# Patient Record
Sex: MALE | Race: WHITE | Employment: OTHER | URBAN - METROPOLITAN AREA
[De-identification: names, ages, dates, MRNs, and addresses within clinical notes are randomized per-mention and may not be internally consistent; named-entity substitution may affect disease eponyms.]

---

## 2017-01-06 ENCOUNTER — OFFICE VISIT (OUTPATIENT)
Dept: INTERNAL MEDICINE CLINIC | Age: 67
End: 2017-01-06

## 2017-01-06 VITALS
OXYGEN SATURATION: 98 % | WEIGHT: 184 LBS | SYSTOLIC BLOOD PRESSURE: 148 MMHG | BODY MASS INDEX: 26.98 KG/M2 | DIASTOLIC BLOOD PRESSURE: 80 MMHG | HEART RATE: 64 BPM

## 2017-01-06 DIAGNOSIS — Z12.5 SPECIAL SCREENING FOR MALIGNANT NEOPLASM OF PROSTATE: ICD-10-CM

## 2017-01-06 DIAGNOSIS — Z95.2 H/O MECHANICAL AORTIC VALVE REPLACEMENT: ICD-10-CM

## 2017-01-06 DIAGNOSIS — E78.5 HYPERLIPIDEMIA, UNSPECIFIED HYPERLIPIDEMIA TYPE: ICD-10-CM

## 2017-01-06 DIAGNOSIS — E11.9 TYPE 2 DIABETES MELLITUS WITHOUT COMPLICATION, WITHOUT LONG-TERM CURRENT USE OF INSULIN (HCC): ICD-10-CM

## 2017-01-06 DIAGNOSIS — I25.10 CORONARY ARTERY DISEASE INVOLVING NATIVE CORONARY ARTERY OF NATIVE HEART WITHOUT ANGINA PECTORIS: Primary | ICD-10-CM

## 2017-01-06 DIAGNOSIS — K63.5 COLON POLYPS: ICD-10-CM

## 2017-01-06 LAB
CREATININE URINE POCT: ABNORMAL
MICROALBUMIN/CREAT 24H UR: 20 MG/G{CREAT}

## 2017-01-06 PROCEDURE — 3288F FALL RISK ASSESSMENT DOCD: CPT | Performed by: INTERNAL MEDICINE

## 2017-01-06 PROCEDURE — G8510 SCR DEP NEG, NO PLAN REQD: HCPCS | Performed by: INTERNAL MEDICINE

## 2017-01-06 PROCEDURE — 82044 UR ALBUMIN SEMIQUANTITATIVE: CPT | Performed by: INTERNAL MEDICINE

## 2017-01-06 PROCEDURE — 99214 OFFICE O/P EST MOD 30 MIN: CPT | Performed by: INTERNAL MEDICINE

## 2017-01-06 RX ORDER — GLUCOSAMINE SULFATE 500 MG
CAPSULE ORAL
COMMUNITY
End: 2019-07-03

## 2017-01-06 ASSESSMENT — PATIENT HEALTH QUESTIONNAIRE - PHQ9
SUM OF ALL RESPONSES TO PHQ QUESTIONS 1-9: 0
2. FEELING DOWN, DEPRESSED OR HOPELESS: 0
SUM OF ALL RESPONSES TO PHQ9 QUESTIONS 1 & 2: 0
1. LITTLE INTEREST OR PLEASURE IN DOING THINGS: 0

## 2017-01-31 DIAGNOSIS — E11.9 TYPE 2 DIABETES MELLITUS WITHOUT COMPLICATION (HCC): ICD-10-CM

## 2017-01-31 RX ORDER — GLIMEPIRIDE 1 MG/1
TABLET ORAL
Qty: 90 TABLET | Refills: 3 | Status: SHIPPED | OUTPATIENT
Start: 2017-01-31 | End: 2018-01-16 | Stop reason: SDUPTHER

## 2017-02-20 DIAGNOSIS — E11.9 TYPE 2 DIABETES MELLITUS WITHOUT COMPLICATION (HCC): ICD-10-CM

## 2017-02-20 RX ORDER — LISINOPRIL 5 MG/1
TABLET ORAL
Qty: 90 TABLET | Refills: 3 | Status: SHIPPED | OUTPATIENT
Start: 2017-02-20 | End: 2017-07-13 | Stop reason: SDUPTHER

## 2017-03-07 RX ORDER — PRAVASTATIN SODIUM 80 MG/1
TABLET ORAL
Qty: 90 TABLET | Refills: 3 | Status: SHIPPED | OUTPATIENT
Start: 2017-03-07 | End: 2018-01-16 | Stop reason: SDUPTHER

## 2017-03-07 RX ORDER — FENOFIBRATE 160 MG/1
TABLET ORAL
Qty: 90 TABLET | Refills: 3 | Status: SHIPPED | OUTPATIENT
Start: 2017-03-07 | End: 2018-01-16 | Stop reason: SDUPTHER

## 2017-03-28 RX ORDER — BLOOD SUGAR DIAGNOSTIC
STRIP MISCELLANEOUS
Qty: 100 STRIP | Refills: 12 | Status: SHIPPED | OUTPATIENT
Start: 2017-03-28 | End: 2018-04-22 | Stop reason: SDUPTHER

## 2017-05-18 ENCOUNTER — HOSPITAL ENCOUNTER (OUTPATIENT)
Dept: SURGERY | Age: 67
Discharge: HOME | End: 2017-05-18
Attending: INTERNAL MEDICINE | Admitting: INTERNAL MEDICINE

## 2017-05-18 VITALS
RESPIRATION RATE: 18 BRPM | HEART RATE: 54 BPM | DIASTOLIC BLOOD PRESSURE: 72 MMHG | SYSTOLIC BLOOD PRESSURE: 122 MMHG | TEMPERATURE: 97.8 F | WEIGHT: 176 LBS | HEIGHT: 69 IN | BODY MASS INDEX: 26.07 KG/M2 | OXYGEN SATURATION: 94 %

## 2017-05-18 DIAGNOSIS — Z86.010 HISTORY OF COLONIC POLYPS: ICD-10-CM

## 2017-05-18 LAB
GLUCOSE BLD-MCNC: 122 MG/DL (ref 70–99)
GLUCOSE BLD-MCNC: 142 MG/DL (ref 70–99)
PERFORMED ON: ABNORMAL
PERFORMED ON: ABNORMAL

## 2017-05-18 RX ORDER — SODIUM CHLORIDE, SODIUM LACTATE, POTASSIUM CHLORIDE, CALCIUM CHLORIDE 600; 310; 30; 20 MG/100ML; MG/100ML; MG/100ML; MG/100ML
INJECTION, SOLUTION INTRAVENOUS CONTINUOUS
Status: DISCONTINUED | OUTPATIENT
Start: 2017-05-18 | End: 2017-05-19 | Stop reason: HOSPADM

## 2017-05-18 RX ORDER — LIDOCAINE HYDROCHLORIDE 10 MG/ML
0.1 INJECTION, SOLUTION INFILTRATION; PERINEURAL ONCE
Status: DISCONTINUED | OUTPATIENT
Start: 2017-05-18 | End: 2017-05-19 | Stop reason: HOSPADM

## 2017-05-18 RX ADMIN — SODIUM CHLORIDE, SODIUM LACTATE, POTASSIUM CHLORIDE, CALCIUM CHLORIDE: 600; 310; 30; 20 INJECTION, SOLUTION INTRAVENOUS at 07:01

## 2017-05-18 ASSESSMENT — PAIN SCALES - GENERAL
PAINLEVEL_OUTOF10: 0
PAINLEVEL_OUTOF10: 0

## 2017-05-18 ASSESSMENT — PAIN - FUNCTIONAL ASSESSMENT: PAIN_FUNCTIONAL_ASSESSMENT: 0-10

## 2017-07-13 ENCOUNTER — OFFICE VISIT (OUTPATIENT)
Dept: INTERNAL MEDICINE CLINIC | Age: 67
End: 2017-07-13

## 2017-07-13 VITALS
OXYGEN SATURATION: 98 % | DIASTOLIC BLOOD PRESSURE: 70 MMHG | HEART RATE: 62 BPM | HEIGHT: 69 IN | SYSTOLIC BLOOD PRESSURE: 144 MMHG | WEIGHT: 181 LBS | BODY MASS INDEX: 26.81 KG/M2

## 2017-07-13 DIAGNOSIS — E78.5 HYPERLIPIDEMIA, UNSPECIFIED HYPERLIPIDEMIA TYPE: Primary | ICD-10-CM

## 2017-07-13 DIAGNOSIS — I25.10 CORONARY ARTERY DISEASE INVOLVING NATIVE CORONARY ARTERY OF NATIVE HEART WITHOUT ANGINA PECTORIS: ICD-10-CM

## 2017-07-13 DIAGNOSIS — E11.9 TYPE 2 DIABETES MELLITUS WITHOUT COMPLICATION, WITHOUT LONG-TERM CURRENT USE OF INSULIN (HCC): ICD-10-CM

## 2017-07-13 PROCEDURE — 99214 OFFICE O/P EST MOD 30 MIN: CPT | Performed by: INTERNAL MEDICINE

## 2017-07-13 RX ORDER — LISINOPRIL 10 MG/1
TABLET ORAL
Qty: 90 TABLET | Refills: 3 | Status: SHIPPED | OUTPATIENT
Start: 2017-07-13 | End: 2018-01-16 | Stop reason: SDUPTHER

## 2017-09-14 DIAGNOSIS — E11.9 TYPE 2 DIABETES MELLITUS WITHOUT COMPLICATION, WITHOUT LONG-TERM CURRENT USE OF INSULIN (HCC): Primary | ICD-10-CM

## 2017-09-14 RX ORDER — LANCETS 30 GAUGE
1 EACH MISCELLANEOUS DAILY
Qty: 100 EACH | Refills: 3 | Status: SHIPPED | OUTPATIENT
Start: 2017-09-14 | End: 2019-09-09 | Stop reason: SDUPTHER

## 2018-01-16 ENCOUNTER — OFFICE VISIT (OUTPATIENT)
Dept: INTERNAL MEDICINE CLINIC | Age: 68
End: 2018-01-16

## 2018-01-16 VITALS
DIASTOLIC BLOOD PRESSURE: 58 MMHG | BODY MASS INDEX: 26.66 KG/M2 | HEART RATE: 70 BPM | OXYGEN SATURATION: 98 % | WEIGHT: 180 LBS | HEIGHT: 69 IN | SYSTOLIC BLOOD PRESSURE: 130 MMHG

## 2018-01-16 DIAGNOSIS — E11.9 TYPE 2 DIABETES MELLITUS WITHOUT COMPLICATION, WITHOUT LONG-TERM CURRENT USE OF INSULIN (HCC): Primary | ICD-10-CM

## 2018-01-16 DIAGNOSIS — N18.30 STAGE 3 CHRONIC KIDNEY DISEASE (HCC): ICD-10-CM

## 2018-01-16 DIAGNOSIS — I10 ESSENTIAL HYPERTENSION: ICD-10-CM

## 2018-01-16 DIAGNOSIS — I25.10 CORONARY ARTERY DISEASE INVOLVING NATIVE CORONARY ARTERY OF NATIVE HEART WITHOUT ANGINA PECTORIS: ICD-10-CM

## 2018-01-16 DIAGNOSIS — G89.29 CHRONIC LEFT SHOULDER PAIN: ICD-10-CM

## 2018-01-16 DIAGNOSIS — M25.512 CHRONIC LEFT SHOULDER PAIN: ICD-10-CM

## 2018-01-16 DIAGNOSIS — Z12.5 SPECIAL SCREENING FOR MALIGNANT NEOPLASM OF PROSTATE: ICD-10-CM

## 2018-01-16 DIAGNOSIS — E78.5 HYPERLIPIDEMIA, UNSPECIFIED HYPERLIPIDEMIA TYPE: ICD-10-CM

## 2018-01-16 LAB
CREATININE URINE POCT: NORMAL
MICROALBUMIN/CREAT 24H UR: 20 MG/G{CREAT}
MICROALBUMIN/CREAT UR-RTO: NORMAL

## 2018-01-16 PROCEDURE — 4040F PNEUMOC VAC/ADMIN/RCVD: CPT | Performed by: INTERNAL MEDICINE

## 2018-01-16 PROCEDURE — 99214 OFFICE O/P EST MOD 30 MIN: CPT | Performed by: INTERNAL MEDICINE

## 2018-01-16 PROCEDURE — 82044 UR ALBUMIN SEMIQUANTITATIVE: CPT | Performed by: INTERNAL MEDICINE

## 2018-01-16 PROCEDURE — G8598 ASA/ANTIPLAT THER USED: HCPCS | Performed by: INTERNAL MEDICINE

## 2018-01-16 PROCEDURE — 3045F PR MOST RECENT HEMOGLOBIN A1C LEVEL 7.0-9.0%: CPT | Performed by: INTERNAL MEDICINE

## 2018-01-16 PROCEDURE — G8427 DOCREV CUR MEDS BY ELIG CLIN: HCPCS | Performed by: INTERNAL MEDICINE

## 2018-01-16 PROCEDURE — 1123F ACP DISCUSS/DSCN MKR DOCD: CPT | Performed by: INTERNAL MEDICINE

## 2018-01-16 PROCEDURE — 3017F COLORECTAL CA SCREEN DOC REV: CPT | Performed by: INTERNAL MEDICINE

## 2018-01-16 PROCEDURE — G8419 CALC BMI OUT NRM PARAM NOF/U: HCPCS | Performed by: INTERNAL MEDICINE

## 2018-01-16 PROCEDURE — 1036F TOBACCO NON-USER: CPT | Performed by: INTERNAL MEDICINE

## 2018-01-16 PROCEDURE — G8484 FLU IMMUNIZE NO ADMIN: HCPCS | Performed by: INTERNAL MEDICINE

## 2018-01-16 RX ORDER — DIPHENOXYLATE HYDROCHLORIDE AND ATROPINE SULFATE 2.5; .025 MG/1; MG/1
1 TABLET ORAL 4 TIMES DAILY PRN
Qty: 15 TABLET | Refills: 1 | Status: SHIPPED | OUTPATIENT
Start: 2018-01-16 | End: 2019-05-31 | Stop reason: SDUPTHER

## 2018-01-16 RX ORDER — LISINOPRIL 10 MG/1
TABLET ORAL
Qty: 90 TABLET | Refills: 3 | Status: SHIPPED | OUTPATIENT
Start: 2018-01-16 | End: 2019-03-07 | Stop reason: SDUPTHER

## 2018-01-16 RX ORDER — PRAVASTATIN SODIUM 80 MG/1
TABLET ORAL
Qty: 90 TABLET | Refills: 3 | Status: SHIPPED | OUTPATIENT
Start: 2018-01-16 | End: 2019-03-07 | Stop reason: SDUPTHER

## 2018-01-16 RX ORDER — GLIMEPIRIDE 1 MG/1
TABLET ORAL
Qty: 90 TABLET | Refills: 3 | Status: SHIPPED | OUTPATIENT
Start: 2018-01-16 | End: 2018-12-29 | Stop reason: SDUPTHER

## 2018-01-16 RX ORDER — FENOFIBRATE 160 MG/1
TABLET ORAL
Qty: 90 TABLET | Refills: 3 | Status: SHIPPED | OUTPATIENT
Start: 2018-01-16 | End: 2019-03-23 | Stop reason: SDUPTHER

## 2018-01-16 ASSESSMENT — PATIENT HEALTH QUESTIONNAIRE - PHQ9
SUM OF ALL RESPONSES TO PHQ9 QUESTIONS 1 & 2: 0
2. FEELING DOWN, DEPRESSED OR HOPELESS: 0
SUM OF ALL RESPONSES TO PHQ QUESTIONS 1-9: 0
1. LITTLE INTEREST OR PLEASURE IN DOING THINGS: 0

## 2018-01-22 ENCOUNTER — OFFICE VISIT (OUTPATIENT)
Dept: ORTHOPEDIC SURGERY | Age: 68
End: 2018-01-22

## 2018-01-22 VITALS
DIASTOLIC BLOOD PRESSURE: 64 MMHG | HEIGHT: 69 IN | HEART RATE: 67 BPM | BODY MASS INDEX: 26.25 KG/M2 | SYSTOLIC BLOOD PRESSURE: 133 MMHG | WEIGHT: 177.2 LBS

## 2018-01-22 DIAGNOSIS — M67.912 TENDINOPATHY OF LEFT ROTATOR CUFF: ICD-10-CM

## 2018-01-22 DIAGNOSIS — M25.512 LEFT SHOULDER PAIN, UNSPECIFIED CHRONICITY: Primary | ICD-10-CM

## 2018-01-22 DIAGNOSIS — M79.602 LEFT ARM PAIN: ICD-10-CM

## 2018-01-22 DIAGNOSIS — M75.42 SHOULDER IMPINGEMENT, LEFT: ICD-10-CM

## 2018-01-22 DIAGNOSIS — M77.8 CAPSULITIS OF LEFT SHOULDER: ICD-10-CM

## 2018-01-22 PROBLEM — M25.812 SHOULDER IMPINGEMENT, LEFT: Status: ACTIVE | Noted: 2018-01-22

## 2018-01-22 PROCEDURE — G8427 DOCREV CUR MEDS BY ELIG CLIN: HCPCS | Performed by: FAMILY MEDICINE

## 2018-01-22 PROCEDURE — 20610 DRAIN/INJ JOINT/BURSA W/O US: CPT | Performed by: FAMILY MEDICINE

## 2018-01-22 PROCEDURE — 4040F PNEUMOC VAC/ADMIN/RCVD: CPT | Performed by: FAMILY MEDICINE

## 2018-01-22 PROCEDURE — G8484 FLU IMMUNIZE NO ADMIN: HCPCS | Performed by: FAMILY MEDICINE

## 2018-01-22 PROCEDURE — G8419 CALC BMI OUT NRM PARAM NOF/U: HCPCS | Performed by: FAMILY MEDICINE

## 2018-01-22 PROCEDURE — 99214 OFFICE O/P EST MOD 30 MIN: CPT | Performed by: FAMILY MEDICINE

## 2018-01-22 PROCEDURE — 3017F COLORECTAL CA SCREEN DOC REV: CPT | Performed by: FAMILY MEDICINE

## 2018-01-23 NOTE — PROGRESS NOTES
Chief Complaint    Shoulder Pain (Left shoulder pain)    Initial consultation left shoulder and arm pain with motion loss    History of Present Illness:  Ambrosio Huggins is a 69 y.o. male who is a very pleasant white male retired  who has history of reasonably well controlled type II diabetes and is on Coumadin as he is status post CABG remotely who is a very nice patient of Dr. Stormy Gonzalez who is being seen today in time consultation from Dr. Stormy Gonzalez for evaluation of ongoing left arm much lesser degree shoulder pain. He states that since November 2017 he noticed the gradual onset of achiness primarily to the mid and lower portion of his upper arm with soreness and stiffness and motion loss involving his left shoulder. There is no actual history of injury or new activity but believes his current symptoms may be related to him climbing up and down from history stands while hunting last fall. There is no actual trauma. He deal of actual shoulder pain but with pain reproduced to the upper arm with extreme internal rotation. He has some limitations of abduction and overhead activity as well. He reports no locking or catching. He describes his arm pain is more achy in nature at 2-3+ but can be quite sharp with internal rotation at 78 out of 10. Reports no substantial neck pain and radicular symptoms numbness or tingling. He has not had a dedicated treatment to date but did see Dr. gonzalez in the office last week who recommended today's orthopedic consultation. Previous history of shoulder injury. Medical History  Patient's medications, allergies, past medical, surgical, social and family histories were reviewed and updated as appropriate. Review of Systems  Relevant review of systems reviewed on 1/21/2018 and available in the patient's chart under the medial tab.        Vital Signs  Vitals:    01/22/18 0923   BP: 133/64   Pulse: 67         General Exam:   Constitutional: Patient is

## 2018-01-26 ENCOUNTER — HOSPITAL ENCOUNTER (OUTPATIENT)
Dept: PHYSICAL THERAPY | Age: 68
Discharge: HOME | End: 2018-01-31
Admitting: FAMILY MEDICINE

## 2018-01-26 NOTE — FLOWSHEET NOTE
Reviewed/Progressed HEP activities related to improving balance, coordination, kinesthetic sense, posture, motor skill, proprioception of scapular, scapulothoracic and UE control with self care, reaching, carrying, lifting, house/yardwork, driving/computer work      Manual Treatments:  PROM / STM / Oscillations-Mobs:  G-I, II, III, IV (PA's, Inf., Post.)  [x] (66413) Provided manual therapy to mobilize soft tissue/joints of cervical/CT, scapular GHJ and UE for the purpose of modulating pain, promoting relaxation,  increasing ROM, reducing/eliminating soft tissue swelling/inflammation/restriction, improving soft tissue extensibility and allowing for proper ROM for normal function with self care, reaching, carrying, lifting, house/yardwork, driving/computer work    Modalities:  15 min PM ESU with CP    Charges:  Timed Code Treatment Minutes: 30   Total Treatment Minutes: 60     [x] EVAL (LOW) 16786 (typically 20 minutes face-to-face)  [] EVAL (MOD) 98712 (typically 30 minutes face-to-face)  [] EVAL (HIGH) 42694 (typically 45 minutes face-to-face)  [] RE-EVAL     [x] FF(20037) x  1   [] IONTO  [] NMR (67915) x      [] VASO  [x] Manual (63927) x  1    [] Other:  [] TA x       [] Mech Traction (99849)  [] ES(attended) (03937)      [x] ES (un) (61576):     GOALS:  Patient stated goal: Pt would like to regain painfree ROM    Therapist goals for Patient:   Short Term Goals: To be achieved in: 2 weeks  1. Independent in HEP and progression per patient tolerance, in order to prevent re-injury. 2. Patient will have a decrease in pain to facilitate improvement in movement, function, and ADLs as indicated by Functional Deficits. Long Term Goals: To be achieved in: 8 weeks  1. Disability index score of 10% or less for the [facility] to assist with reaching prior level of function.    2. Patient will demonstrate increased AROM to IR equal bilaterally to allow for proper joint functioning as indicated by patients Functional

## 2018-01-26 NOTE — PLAN OF CARE
window for 20 seconds before he could bring it back in. Difficulty with tucking in shirt and putting on belt, puts L arm in jackets first to prevent pain. Had a shot and was prescribed some cream which did not help. Relevant Medical History:artificial valve  Functional Disability Index:PT G-Codes  Functional Assessment Tool Used: Quick DASH 11  Score: 16%  Functional Limitation: Carrying, moving and handling objects  Carrying, Moving and Handling Objects Current Status (): At least 1 percent but less than 20 percent impaired, limited or restricted  Carrying, Moving and Handling Objects Goal Status ():  At least 1 percent but less than 20 percent impaired, limited or restricted    Pain Scale: 9 (when he does provocative activities) currently 0 /10  Easing factors: avoiding rotational movments  Provocative factors: rotational movements     Type: []Constant   [x]Intermittent  []Radiating []Localized []other:     Numbness/Tingling: none    Occupation/School: retired    Living Status/Prior Level of Function: Independent with ADLs and IADLs, hunting and fly fishing    OBJECTIVE:     CERV ROM     Cervical Flexion WNL    Cervical Extension WNL    Cervical SB WNL    Cervical rotation WNL         ROM Left Right   Shoulder Flex WNL WNL   Shoulder Abd WNL WNL   Shoulder ER C7 T1   Shoulder IR L5 T8                  Strength  Left Right   Shoulder Flex WNL    Shoulder Scap WNL    Shoulder ER 4/5 some pain    Shoulder IR 3+/5 some pain                Reflexes/Sensation:    []Dermatomes/Myotomes intact    []Reflexes equal and normal bilaterally   []Other:  2 levels lower behind head   Joint mobility:   []Normal    [x]Hypo   []Hyper    Palpation: No TTP reported by patient, increased tissue tension in L UT    Functional Mobility/Transfers: No difficulty with transfers or mobility    Posture: rounded shoulders, forward head    Bandages/Dressings/Incisions: n/a    Gait: (include devices/WB status): WNL    Orthopedic Special Tests: negative empty can, negative speeds, negative mcmurrays, negative yergasons                        [x] Patient history, allergies, meds reviewed. Medical chart reviewed. See intake form. Review Of Systems (ROS):  [x]Performed Review of systems (Integumentary, CardioPulmonary, Neurological) by intake and observation. Intake form has been scanned into medical record. Patient has been instructed to contact their primary care physician regarding ROS issues if not already being addressed at this time. Co-morbidities/Complexities (which will affect course of rehabilitation):   []None           Arthritic conditions   []Rheumatoid arthritis (M05.9)  [x]Osteoarthritis (M19.91)   Cardiovascular conditions   [x]Hypertension (I10)  []Hyperlipidemia (E78.5)  []Angina pectoris (I20)  []Atherosclerosis (I70)   Musculoskeletal conditions   []Disc pathology   []Congenital spine pathologies   []Prior surgical intervention  []Osteoporosis (M81.8)  []Osteopenia (M85.8)   Endocrine conditions   []Hypothyroid (E03.9)  []Hyperthyroid Gastrointestinal conditions   []Constipation (E81.29)   Metabolic conditions   []Morbid obesity (E66.01)  [x]Diabetes type 1(E10.65) or 2 (E11.65)   []Neuropathy (G60.9)     Pulmonary conditions   []Asthma (J45)  []Coughing   []COPD (J44.9)   Psychological Disorders  []Anxiety (F41.9)  []Depression (F32.9)   []Other:   [x]Other:     Artifical valve     Barriers to/and or personal factors that will affect rehab potential:              []Age  []Sex              []Motivation/Lack of Motivation                        [x]Co-Morbidities              [x]Cognitive Function, education/learning barriers              []Environmental, home barriers              []profession/work barriers  []past PT/medical experience  []other:    Justification:    Falls Risk Assessment (30 days):   [x] Falls Risk assessed and no intervention required.   [] Falls Risk assessed and Patient requires intervention due to being higher risk   TUG score (>12s at risk):     [] Falls education provided, including       G-Codes:  PT G-Codes  Functional Assessment Tool Used: Quick DASH 11  Score: 16%  Functional Limitation: Carrying, moving and handling objects  Carrying, Moving and Handling Objects Current Status (): At least 1 percent but less than 20 percent impaired, limited or restricted  Carrying, Moving and Handling Objects Goal Status (): At least 1 percent but less than 20 percent impaired, limited or restricted    ASSESSMENT:   Functional Impairments   [x]Noted spinal or UE joint hypomobility   []Noted spinal or UE joint hypermobility   [x]Decreased UE functional ROM   [x]Decreased UE functional strength   [x]Abnormal reflexes/sensation/myotomal/dermatomal deficits   [x]Decreased RC/scapular/core strength and neuromuscular control   []other:      Functional Activity Limitations (from functional questionnaire and intake)   []Reduced ability to tolerate prolonged functional positions   []Reduced ability or difficulty with changes of positions or transfers between positions   [x]Reduced ability to maintain good posture and demonstrate good body mechanics with sitting, bending, and lifting   [x] Reduced ability or tolerance with driving and/or computer work   []Reduced ability to sleep   [x]Reduced ability to perform lifting, reaching, carrying tasks   []Reduced ability to tolerate impact through UE   [x]Reduced ability to reach behind back   []Reduced ability to  or hold objects   [x]Reduced ability to throw or toss an object   []other:    Participation Restrictions   [x]Reduced participation in self care activities   [x]Reduced participation in home management activities   []Reduced participation in work activities   [x]Reduced participation in social activities. [x]Reduced participation in sport/recreation activities.     Classification:   []Signs/symptoms consistent with post-surgical status including decreased ROM, strength and function. [x]Signs/symptoms consistent with joint sprain/strain   []Signs/symptoms consistent with shoulder impingement   []Signs/symptoms consistent with shoulder/elbow/wrist tendinopathy   []Signs/symptoms consistent with Rotator cuff tear   []Signs/symptoms consistent with labral tear   []Signs/symptoms consistent with postural dysfunction    [x]Signs/symptoms consistent with Glenohumeral IR Deficit - <45 degrees   []Signs/symptoms consistent with facet dysfunction of cervical/thoracic spine    []Signs/symptoms consistent with pathology which may benefit from Dry needling     []other:     Prognosis/Rehab Potential:      []Excellent   [x]Good    []Fair   []Poor    Tolerance of evaluation/treatment:    []Excellent   [x]Good    []Fair   []Poor  Physical Therapy Evaluation Complexity Justification  [x] A history of present problem with:  [] no personal factors and/or comorbidities that impact the plan of care;  [x]1-2 personal factors and/or comorbidities that impact the plan of care  []3 personal factors and/or comorbidities that impact the plan of care  [x] An examination of body systems using standardized tests and measures addressing any of the following: body structures and functions (impairments), activity limitations, and/or participation restrictions;:  [x] a total of 1-2 or more elements   [] a total of 3 or more elements   [] a total of 4 or more elements   [x] A clinical presentation with:  [x] stable and/or uncomplicated characteristics   [] evolving clinical presentation with changing characteristics  [] unstable and unpredictable characteristics;   [x] Clinical decision making of [x] low, [] moderate, [] high complexity using standardized patient assessment instrument and/or measurable assessment of functional outcome.     [x] EVAL (LOW) 90366 (typically 20 minutes face-to-face)  [] EVAL (MOD) 61812 (typically 30 minutes face-to-face)  [] EVAL (HIGH) 15745 (typically 45 minutes face-to-face)  [] RE-EVAL       PLAN:  Frequency/Duration:  1-2 days per week for 8 Weeks:  INTERVENTIONS:  [x] Therapeutic exercise including: strength training, ROM, for Upper extremity and core   [x]  NMR activation and proprioception for UE, scap and Core   [x] Manual therapy as indicated for shoulder, scapula and spine to include: Dry Needling/IASTM, STM, PROM, Gr I-IV mobilizations, manipulation. [x] Modalities as needed that may include: thermal agents, E-stim, Biofeedback, US, iontophoresis as indicated  [x] Patient education on joint protection, postural re-education, activity modification, progression of HEP. HEP instruction: (see scanned forms)    GOALS:  Patient stated goal: Pt would like to regain painfree ROM    Therapist goals for Patient:   Short Term Goals: To be achieved in: 2 weeks  1. Independent in HEP and progression per patient tolerance, in order to prevent re-injury. 2. Patient will have a decrease in pain to facilitate improvement in movement, function, and ADLs as indicated by Functional Deficits. Long Term Goals: To be achieved in: 8 weeks  1. Disability index score of 10% or less for the [facility] to assist with reaching prior level of function. 2. Patient will demonstrate increased AROM to IR equal bilaterally to allow for proper joint functioning as indicated by patients Functional Deficits. 3. Patient will demonstrate an increase in Strength to 4=/5 IR to allow for proper functional mobility as indicated by patients Functional Deficits. 4. Patient will return to be able to tuck shirt in/put belt on without increased symptoms or restriction.           Electronically signed by:  Krista Silva PT

## 2018-01-30 ENCOUNTER — HOSPITAL ENCOUNTER (OUTPATIENT)
Dept: PHYSICAL THERAPY | Age: 68
Discharge: HOME | End: 2018-01-30
Admitting: FAMILY MEDICINE

## 2018-02-01 ENCOUNTER — HOSPITAL ENCOUNTER (OUTPATIENT)
Dept: PHYSICAL THERAPY | Age: 68
Discharge: HOME | End: 2018-02-28
Attending: FAMILY MEDICINE | Admitting: FAMILY MEDICINE

## 2018-02-02 ENCOUNTER — HOSPITAL ENCOUNTER (OUTPATIENT)
Dept: PHYSICAL THERAPY | Age: 68
Discharge: HOME | End: 2018-02-03
Admitting: FAMILY MEDICINE

## 2018-02-02 NOTE — FLOWSHEET NOTE
Reviewed/Progressed HEP activities related to improving balance, coordination, kinesthetic sense, posture, motor skill, proprioception of scapular, scapulothoracic and UE control with self care, reaching, carrying, lifting, house/yardwork, driving/computer work      Manual Treatments:  PROM / STM / Oscillations-Mobs:  G-I, II, III, IV (PA's, Inf., Post.)  [x] (25222) Provided manual therapy to mobilize soft tissue/joints of cervical/CT, scapular GHJ and UE for the purpose of modulating pain, promoting relaxation,  increasing ROM, reducing/eliminating soft tissue swelling/inflammation/restriction, improving soft tissue extensibility and allowing for proper ROM for normal function with self care, reaching, carrying, lifting, house/yardwork, driving/computer work    Modalities:  15 min PM ESU with CP    Charges:  Timed Code Treatment Minutes: 40   Total Treatment Minutes: 60     [] EVAL (LOW) 41167 (typically 20 minutes face-to-face)  [] EVAL (MOD) 90108 (typically 30 minutes face-to-face)  [] EVAL (HIGH) 77988 (typically 45 minutes face-to-face)  [] RE-EVAL     [x] OE(66310) x  2   [] IONTO  [] NMR (08619) x      [] VASO  [x] Manual (53971) x  1    [] Other:  [] TA x       [] Mech Traction (02243)  [] ES(attended) (39955)      [x] ES (un) (95303):     GOALS:  Patient stated goal: Pt would like to regain painfree ROM    Therapist goals for Patient:   Short Term Goals: To be achieved in: 2 weeks  1. Independent in HEP and progression per patient tolerance, in order to prevent re-injury. 2. Patient will have a decrease in pain to facilitate improvement in movement, function, and ADLs as indicated by Functional Deficits. Long Term Goals: To be achieved in: 8 weeks  1. Disability index score of 10% or less for the [facility] to assist with reaching prior level of function.    2. Patient will demonstrate increased AROM to IR equal bilaterally to allow for proper joint functioning as indicated by patients Functional

## 2018-02-06 ENCOUNTER — HOSPITAL ENCOUNTER (OUTPATIENT)
Dept: PHYSICAL THERAPY | Age: 68
Discharge: HOME | End: 2018-02-07
Admitting: FAMILY MEDICINE

## 2018-02-06 NOTE — FLOWSHEET NOTE
this week to continue working. OBJECTIVE:   Observation: Improved scapular mechanics, increased IR with decreased symptoms  Palpation:     Test used Initial score Current Score   Pain Summary VAS 9    Functional questionnaire QDash 16%    ROM flexion WNL     ER C7     ABD      IR L5 L3   Strength flexion WNL     ER 4/5 pain     ABD      IR 3+/5 pain       RESTRICTIONS/PRECAUTIONS:     Exercises/Interventions:   Therapeutic Ex Sets/reps Notes   Triceps stretch 3 x 30\" 2 x 10   Behind back IR nerve glide  2 x 10   Supine ER behind head stretch 3 x 30\" 2 x 10   SL ER  SBS  No $ 2 x 10  1 x 10  2 x 10 HEP  HEP  HEP YTB   Supine Sa, R.S. Up/down, s/s 2 x 10 2#   Prone row 2 x 10 2#   TB Row/ext 2 x 10 HEP RTB   TB ER 20x RTB   Ball on wall RS 3 x 30\"                                                                Manual Intervention     GH mobs grade 1-2 PA/inf 4 min    Shoulder PROM 6 min    STM and CPA grades 1-2 cervical spine 5 min                        NMR re-education                                                 Therapeutic Exercise and NMR EXR  [x] (18130) Provided verbal/tactile cueing for activities related to strengthening, flexibility, endurance, ROM  for improvements in scapular, scapulothoracic and UE control with self care, reaching, carrying, lifting, house/yardwork, driving/computer work.    [] (50148) Provided verbal/tactile cueing for activities related to improving balance, coordination, kinesthetic sense, posture, motor skill, proprioception  to assist with  scapular, scapulothoracic and UE control with self care, reaching, carrying, lifting, house/yardwork, driving/computer work.     Therapeutic Activities:    [] (23117 or 66836) Provided verbal/tactile cueing for activities related to improving balance, coordination, kinesthetic sense, posture, motor skill, proprioception and motor activation to allow for proper function of scapular, scapulothoracic and UE control with self care, carrying,

## 2018-02-09 ENCOUNTER — HOSPITAL ENCOUNTER (OUTPATIENT)
Dept: PHYSICAL THERAPY | Age: 68
Discharge: HOME | End: 2018-02-10
Admitting: FAMILY MEDICINE

## 2018-02-09 NOTE — FLOWSHEET NOTE
RESTRICTIONS/PRECAUTIONS:     Exercises/Interventions:   Therapeutic Ex Sets/reps Notes   Triceps stretch 3 x 30\" 2 x 10   Behind back IR nerve glide  2 x 10   Supine ER behind head stretch 3 x 30\" 2 x 10   SL ER  SBS  No $ 2 x 10  1 x 10  2 x 10 1# HEP  HEP  HEP RTB   Supine Sa, R.S. Up/down, s/s 2 x 10 2#   Prone row 2 x 10 2#   TB Row/ext 2 x 10 HEP RTB   TB ER/IR 20x RTB   Sleeper stretch 3 x 30\"                                                                Manual Intervention     GH mobs grade 1-2 PA/inf 4 min    Shoulder PROM 6 min    STM and CPA grades 1-2 cervical spine     STM posterior scapula and scap stabilizers 5 min                   NMR re-education                                                 Therapeutic Exercise and NMR EXR  [x] (55780) Provided verbal/tactile cueing for activities related to strengthening, flexibility, endurance, ROM  for improvements in scapular, scapulothoracic and UE control with self care, reaching, carrying, lifting, house/yardwork, driving/computer work.    [] (99143) Provided verbal/tactile cueing for activities related to improving balance, coordination, kinesthetic sense, posture, motor skill, proprioception  to assist with  scapular, scapulothoracic and UE control with self care, reaching, carrying, lifting, house/yardwork, driving/computer work. Therapeutic Activities:    [] (45150 or 11797) Provided verbal/tactile cueing for activities related to improving balance, coordination, kinesthetic sense, posture, motor skill, proprioception and motor activation to allow for proper function of scapular, scapulothoracic and UE control with self care, carrying, lifting, driving/computer work.      Home Exercise Program:    [x] (60482) Reviewed/Progressed HEP activities related to strengthening, flexibility, endurance, ROM of scapular, scapulothoracic and UE control with self care, reaching, carrying, lifting, house/yardwork, driving/computer work  [] (04569)

## 2018-02-13 ENCOUNTER — HOSPITAL ENCOUNTER (OUTPATIENT)
Dept: PHYSICAL THERAPY | Age: 68
Discharge: HOME | End: 2018-02-14
Admitting: FAMILY MEDICINE

## 2018-02-16 ENCOUNTER — HOSPITAL ENCOUNTER (OUTPATIENT)
Dept: PHYSICAL THERAPY | Age: 68
Discharge: HOME | End: 2018-02-17
Admitting: FAMILY MEDICINE

## 2018-02-16 NOTE — FLOWSHEET NOTE
[facility], [address]  Phone: [phone]  Fax [phone]    Physical Therapy Daily Treatment Note  Date:  2018    Patient Name:  Silvano Quiroga    :  1950  MRN: [id number]  Restrictions/Precautions:    Physician Information:  Referring Practitioner: Dr. Rach Durham  Medical/Treatment Diagnosis Information:  · Diagnosis: M25.512 (ICD-10-CM) - Left shoulder pain, unspecified chronicity,   M79.602 (ICD-10-CM) - Left arm pain,   M75.82 (ICD-10-CM) - Capsulitis of left shoulder  · Treatment Diagnosis: L shoulder pain M25.512,  L shoulder stiffness M25.612,  L shoulder instability M25.312                    [x] Conservative / [] Surgical - DOS:  Therapy Diagnosis/Practice Pattern:  Practice Pattern A: Primary Prevention  Insurance/Certification information:  PT Insurance Information: [employer]  - $40CP - $0DED - PT/OT MED NEC - 100% - NO AUTH  Plan of care signed: [x] YES  [] NO  Number of Comorbidities:  []0     [x]1-2    []3+  Date of Patient follow up with Physician:     G-Code (if applicable):      Date G-Code Applied:         Progress Note: [x]  Yes  []  No  Next due by: Visit #10        Latex Allergy:  [x]NO      []YES  Preferred Language for Healthcare:   [x]English       []other:    Visit # Insurance Allowable Reporting Period   7 BMN Begin Date: 2018               End Date:      RECERT DUE BY:      SUBJECTIVE:  Pt reports arm is acting up on him and has some episodes of increased pain since last visit.      OBJECTIVE:   Observation: Improved scapular mechanics, increased IR with decreased symptoms  Palpation:     Test used Initial score Current Score   Pain Summary VAS 9 6-7   Functional questionnaire QDash 16%    ROM flexion WNL     ER C7     ABD      IR L5 L3   Strength flexion WNL     ER 4/5 pain     ABD      IR 3+/5 pain       RESTRICTIONS/PRECAUTIONS:     Exercises/Interventions:

## 2018-02-20 ENCOUNTER — HOSPITAL ENCOUNTER (OUTPATIENT)
Dept: PHYSICAL THERAPY | Age: 68
Discharge: HOME | End: 2018-02-21
Admitting: FAMILY MEDICINE

## 2018-02-20 NOTE — FLOWSHEET NOTE
10% or less for the [facility] to assist with reaching prior level of function. 2. Patient will demonstrate increased AROM to IR equal bilaterally to allow for proper joint functioning as indicated by patients Functional Deficits. 3. Patient will demonstrate an increase in Strength to 4+/5 IR to allow for proper functional mobility as indicated by patients Functional Deficits. 4. Patient will return to be able to tuck shirt in/put belt on without increased symptoms or restriction. New or Updated Goals (if applicable):  [x] No change to goals established upon initial eval/last progress note:  New Goals:    Progression Towards Functional goals:   [x] Patient is progressing as expected towards functional goals listed. [] Progression is slowed due to complexities listed. [] Progression has been slowed due to co-morbidities. [] Plan just implemented, too soon to assess goals progression  [] Other:     ASSESSMENT:  Pt demonstrating improved ROM and scapular mechanics, Pt is able to better tolerate exercises and manual, Pt more understanding of process and engaged each session.   [] Improvement noted relative to goals:  [] No Improvement noted related to goals:  Summary/Patient's response to treatment: See Eval    Treatment/Activity Tolerance:  [x] Patient tolerated treatment well [] Patient limited by fatique  [] Patient limited by pain  [] Patient limited by other medical complications  [] Other:     Prognosis: [x] Good [] Fair  [] Poor    Patient Requires Follow-up: [x] Yes  [] No    PLAN: See eval  [x] Continue per plan of care [] Alter current plan (see comments)  [] Plan of care initiated [] Hold pending MD visit [] Discharge    Electronically signed by: Krista Silva PT

## 2018-02-21 ENCOUNTER — OFFICE VISIT (OUTPATIENT)
Dept: ORTHOPEDIC SURGERY | Age: 68
End: 2018-02-21

## 2018-02-21 VITALS
WEIGHT: 177.25 LBS | HEIGHT: 69 IN | HEART RATE: 63 BPM | SYSTOLIC BLOOD PRESSURE: 124 MMHG | BODY MASS INDEX: 26.25 KG/M2 | DIASTOLIC BLOOD PRESSURE: 63 MMHG

## 2018-02-21 DIAGNOSIS — M77.8 CAPSULITIS OF LEFT SHOULDER: Primary | ICD-10-CM

## 2018-02-21 DIAGNOSIS — M79.602 LEFT ARM PAIN: ICD-10-CM

## 2018-02-21 DIAGNOSIS — M67.912 TENDINOPATHY OF LEFT ROTATOR CUFF: ICD-10-CM

## 2018-02-21 DIAGNOSIS — M75.42 SHOULDER IMPINGEMENT, LEFT: ICD-10-CM

## 2018-02-21 DIAGNOSIS — M25.512 ACUTE PAIN OF LEFT SHOULDER: ICD-10-CM

## 2018-02-21 PROCEDURE — 3017F COLORECTAL CA SCREEN DOC REV: CPT | Performed by: FAMILY MEDICINE

## 2018-02-21 PROCEDURE — G8598 ASA/ANTIPLAT THER USED: HCPCS | Performed by: FAMILY MEDICINE

## 2018-02-21 PROCEDURE — G8427 DOCREV CUR MEDS BY ELIG CLIN: HCPCS | Performed by: FAMILY MEDICINE

## 2018-02-21 PROCEDURE — 4040F PNEUMOC VAC/ADMIN/RCVD: CPT | Performed by: FAMILY MEDICINE

## 2018-02-21 PROCEDURE — 1123F ACP DISCUSS/DSCN MKR DOCD: CPT | Performed by: FAMILY MEDICINE

## 2018-02-21 PROCEDURE — G8419 CALC BMI OUT NRM PARAM NOF/U: HCPCS | Performed by: FAMILY MEDICINE

## 2018-02-21 PROCEDURE — G8484 FLU IMMUNIZE NO ADMIN: HCPCS | Performed by: FAMILY MEDICINE

## 2018-02-21 PROCEDURE — 99214 OFFICE O/P EST MOD 30 MIN: CPT | Performed by: FAMILY MEDICINE

## 2018-02-21 PROCEDURE — 1036F TOBACCO NON-USER: CPT | Performed by: FAMILY MEDICINE

## 2018-02-21 NOTE — PROGRESS NOTES
which has resulted in some improvement of his motion but continues to have achy pain. He has been reasonably compliant with his home exercises and continues to have pain on a daily basis particularly with sudden abduction and attempted internal rotation. He still has daily pain. He has tolerated his topical Voltaren gel but does not know if this is helping him substantially. He does have occasional popping and possibly some catching but no definitive locking neck pain or radicular symptoms have been noted. Medical History  Patient's medications, allergies, past medical, surgical, social and family histories were reviewed and updated as appropriate. Review of Systems  Relevant review of systems reviewed on 1/21/2018 and available in the patient's chart under the medial tab. Vital Signs  Vitals:    02/21/18 1302   BP: 124/63   Pulse: 63         General Exam:   Constitutional: Patient is adequately groomed with no evidence of malnutrition  DTRs: Deep tendon reflexes are intact  Mental Status: The patient is oriented to time, place and person. The patient's mood and affect are appropriate. Lymphatic: The lymphatic examination bilaterally reveals all areas to be without enlargement or induration. Vascular: Examination reveals no swelling or calf tenderness. Peripheral pulses are palpable and 2+. Neurological: The patient has good coordination. There is no weakness or sensory deficit. Shoulder Examination    Inspection:  There is no high-grade deformity or atrophy. He does have mild degenerative changes at the a.c. joint. No tense effusion. Palpation:  He does have some very mild posterior cuff and greater tuberosity tenderness. No substantial a.c. tenderness. Mild tightness to superior rhomboid entrapment. Rang of Motion:  He does have fairly prominent internal rotation deficits. He can only internally rotate to about L4.   He does have about a 10-20° reduction in external

## 2018-02-22 ENCOUNTER — TELEPHONE (OUTPATIENT)
Dept: ORTHOPEDIC SURGERY | Age: 68
End: 2018-02-22

## 2018-03-01 ENCOUNTER — HOSPITAL ENCOUNTER (OUTPATIENT)
Dept: PHYSICAL THERAPY | Age: 68
Discharge: HOME | End: 2018-03-31
Attending: FAMILY MEDICINE | Admitting: FAMILY MEDICINE

## 2018-03-07 ENCOUNTER — OFFICE VISIT (OUTPATIENT)
Dept: ORTHOPEDIC SURGERY | Age: 68
End: 2018-03-07

## 2018-03-07 VITALS
HEIGHT: 69 IN | SYSTOLIC BLOOD PRESSURE: 136 MMHG | HEART RATE: 74 BPM | WEIGHT: 177.36 LBS | DIASTOLIC BLOOD PRESSURE: 63 MMHG | BODY MASS INDEX: 26.27 KG/M2

## 2018-03-07 DIAGNOSIS — M67.912 TENDINOPATHY OF LEFT ROTATOR CUFF: ICD-10-CM

## 2018-03-07 DIAGNOSIS — M25.512 ACUTE PAIN OF LEFT SHOULDER: ICD-10-CM

## 2018-03-07 DIAGNOSIS — M75.02 ADHESIVE CAPSULITIS OF LEFT SHOULDER: Primary | ICD-10-CM

## 2018-03-07 PROCEDURE — 4040F PNEUMOC VAC/ADMIN/RCVD: CPT | Performed by: FAMILY MEDICINE

## 2018-03-07 PROCEDURE — 1123F ACP DISCUSS/DSCN MKR DOCD: CPT | Performed by: FAMILY MEDICINE

## 2018-03-07 PROCEDURE — 20610 DRAIN/INJ JOINT/BURSA W/O US: CPT | Performed by: FAMILY MEDICINE

## 2018-03-07 PROCEDURE — 99213 OFFICE O/P EST LOW 20 MIN: CPT | Performed by: FAMILY MEDICINE

## 2018-03-07 PROCEDURE — G8419 CALC BMI OUT NRM PARAM NOF/U: HCPCS | Performed by: FAMILY MEDICINE

## 2018-03-07 PROCEDURE — G8427 DOCREV CUR MEDS BY ELIG CLIN: HCPCS | Performed by: FAMILY MEDICINE

## 2018-03-07 PROCEDURE — 1036F TOBACCO NON-USER: CPT | Performed by: FAMILY MEDICINE

## 2018-03-07 PROCEDURE — G8598 ASA/ANTIPLAT THER USED: HCPCS | Performed by: FAMILY MEDICINE

## 2018-03-07 PROCEDURE — G8484 FLU IMMUNIZE NO ADMIN: HCPCS | Performed by: FAMILY MEDICINE

## 2018-03-07 PROCEDURE — 3017F COLORECTAL CA SCREEN DOC REV: CPT | Performed by: FAMILY MEDICINE

## 2018-03-07 NOTE — PROGRESS NOTES
Injection administration details:  Date & Time: 3/7/18 9:24 AM  Site & Comments:Left Shoulder administered by Dr Alpa Bernal    Betamethasone (30mg/mL)  # of cc: 2  [facility]:5540-0586-12   Lot number: [id number]  EXP: 02/2019    Marcaine 0.50%  # of cc: 4  [facility]: 1062-8137-05  Lot number: [id number]  EXP: 8/1/2018    1% Lidocaine (10mg/ mL)  # of cc: 3  [facility]: 9938-5030-70  Lot number: [id number]  EXP: 3/1/2019

## 2018-03-08 ENCOUNTER — HOSPITAL ENCOUNTER (OUTPATIENT)
Dept: PHYSICAL THERAPY | Age: 68
Discharge: HOME | End: 2018-03-09
Admitting: FAMILY MEDICINE

## 2018-03-08 PROBLEM — M75.02 ADHESIVE CAPSULITIS OF LEFT SHOULDER: Status: ACTIVE | Noted: 2018-03-08

## 2018-03-08 NOTE — FLOWSHEET NOTE
[facility], [address]  Phone: [phone]  Fax [phone]    Physical Therapy Daily Treatment Note  Date:  3/8/2018    Patient Name:  Lenny Cunningham    :  1950  MRN: [id number]  Restrictions/Precautions:    Physician Information:  Referring Practitioner: Dr. Stoney Dang  Medical/Treatment Diagnosis Information:  · Diagnosis: M25.512 (ICD-10-CM) - Left shoulder pain, unspecified chronicity,   M79.602 (ICD-10-CM) - Left arm pain,   M75.82 (ICD-10-CM) - Capsulitis of left shoulder  · Treatment Diagnosis: L shoulder pain M25.512,  L shoulder stiffness M25.612,  L shoulder instability M25.312                    [x] Conservative / [] Surgical - DOS:  Therapy Diagnosis/Practice Pattern:  Practice Pattern A: Primary Prevention  Insurance/Certification information:  PT Insurance Information: [employer]  - $40CP - $0DED - PT/OT MED [facility] - 100% - NO AUTH  Plan of care signed: [x] YES  [] NO  Number of Comorbidities:  []0     [x]1-2    []3+  Date of Patient follow up with Physician:     G-Code (if applicable):      Date G-Code Applied:         Progress Note: [x]  Yes  []  No  Next due by: Visit #10        Latex Allergy:  [x]NO      []YES  Preferred Language for Healthcare:   [x]English       []other:    Visit # Insurance Allowable Reporting Period   8 BMN Begin Date: 3/8/2018               End Date:      RECERT DUE BY:      SUBJECTIVE:  Pt reports reaching behind back is much better, has more motion and no longer has the \"zing\". Pt reports more instances of \"zing\" with abduction and rotation of arm, cannot describe activity in particular that it happens with.      OBJECTIVE:   Observation: Improved scapular mechanics, increased IR with decreased symptoms  Palpation:     Test used Initial score Current Score   Pain Summary VAS 9 6-7   Functional questionnaire QDash 16%    ROM flexion WNL     ER C7     ABD      IR L5 L3 Strength flexion WNL     ER 4/5 pain     ABD      IR 3+/5 pain       RESTRICTIONS/PRECAUTIONS:     Exercises/Interventions:   Therapeutic Ex Sets/reps Notes   Triceps stretch  2 x 10   Behind back IR nerve glide  2 x 10   Supine ER behind head stretch 3 x 30\" 2 x 10   SL ER  SBS  No $ 2 x 10  1 x 10  2 x 10 2# HEP  HEP  HEP RTB   Supine Sa, R.S. Up/down, s/s 2 x 10 3#   Prone row/ext 2 x 10 2#/3#   TB Row/ext 2 x 10 HEP RTB   TB ER/IR 20x RTB   Sleeper stretch 3 x 30\"    Finisher 20x Up/down, ladder, CW/CCW   Wall push ups with scap squeeze 2 x 10    Forward flexion/ABD TB 2 x 10 YTB, low anchor   D2 flexion TB 2 x 10 YTB, low anchor                                           Manual Intervention     GH mobs grade 1-2 PA/inf 4 min    Shoulder PROM 6 min    STM and CPA grades 1-2 cervical spine     STM, RTC insertions, posterior scapula and scap stabilizers 4 min                   NMR re-education                                                 Therapeutic Exercise and NMR EXR  [x] (55300) Provided verbal/tactile cueing for activities related to strengthening, flexibility, endurance, ROM  for improvements in scapular, scapulothoracic and UE control with self care, reaching, carrying, lifting, house/yardwork, driving/computer work.    [] (69880) Provided verbal/tactile cueing for activities related to improving balance, coordination, kinesthetic sense, posture, motor skill, proprioception  to assist with  scapular, scapulothoracic and UE control with self care, reaching, carrying, lifting, house/yardwork, driving/computer work. Therapeutic Activities:    [] (07064 or 49854) Provided verbal/tactile cueing for activities related to improving balance, coordination, kinesthetic sense, posture, motor skill, proprioception and motor activation to allow for proper function of scapular, scapulothoracic and UE control with self care, carrying, lifting, driving/computer work.      Home Exercise Program:    [x] (63175) Reviewed/Progressed HEP activities related to strengthening, flexibility, endurance, ROM of scapular, scapulothoracic and UE control with self care, reaching, carrying, lifting, house/yardwork, driving/computer work  [] (88753) Reviewed/Progressed HEP activities related to improving balance, coordination, kinesthetic sense, posture, motor skill, proprioception of scapular, scapulothoracic and UE control with self care, reaching, carrying, lifting, house/yardwork, driving/computer work      Manual Treatments:  PROM / STM / Oscillations-Mobs:  G-I, II, III, IV (PA's, Inf., Post.)  [x] (08173) Provided manual therapy to mobilize soft tissue/joints of cervical/CT, scapular GHJ and UE for the purpose of modulating pain, promoting relaxation,  increasing ROM, reducing/eliminating soft tissue swelling/inflammation/restriction, improving soft tissue extensibility and allowing for proper ROM for normal function with self care, reaching, carrying, lifting, house/yardwork, driving/computer work    Modalities:  15 min PM ESU with CP    Charges:  Timed Code Treatment Minutes: 40   Total Treatment Minutes: 55     [] EVAL (LOW) 33054 (typically 20 minutes face-to-face)  [] EVAL (MOD) 83504 (typically 30 minutes face-to-face)  [] EVAL (HIGH) 61581 (typically 45 minutes face-to-face)  [] RE-EVAL     [x] PF(41314) x  2   [] IONTO  [] NMR (38392) x      [] VASO  [x] Manual (94991) x  1    [] Other:  [] TA x       [] Mech Traction (60612)  [] ES(attended) (81845)      [x] ES (un) (13888):     GOALS:  Patient stated goal: Pt would like to regain painfree ROM    Therapist goals for Patient:   Short Term Goals: To be achieved in: 2 weeks  1. Independent in HEP and progression per patient tolerance, in order to prevent re-injury. 2. Patient will have a decrease in pain to facilitate improvement in movement, function, and ADLs as indicated by Functional Deficits. Long Term Goals: To be achieved in: 8 weeks  1.  Disability index score of 10% or less for the [facility] to assist with reaching prior level of function. 2. Patient will demonstrate increased AROM to IR equal bilaterally to allow for proper joint functioning as indicated by patients Functional Deficits. 3. Patient will demonstrate an increase in Strength to 4+/5 IR to allow for proper functional mobility as indicated by patients Functional Deficits. 4. Patient will return to be able to tuck shirt in/put belt on without increased symptoms or restriction. New or Updated Goals (if applicable):  [x] No change to goals established upon initial eval/last progress note:  New Goals:    Progression Towards Functional goals:   [x] Patient is progressing as expected towards functional goals listed. [] Progression is slowed due to complexities listed. [] Progression has been slowed due to co-morbidities. [] Plan just implemented, too soon to assess goals progression  [] Other:     ASSESSMENT:    [] Improvement noted relative to goals:  [] No Improvement noted related to goals:  Summary/Patient's response to treatment: Pt tolerated increased duration and intensity of manual therapy. Pt scapular mechanics also improving.      Treatment/Activity Tolerance:  [x] Patient tolerated treatment well [] Patient limited by fatique  [] Patient limited by pain  [] Patient limited by other medical complications  [] Other:     Prognosis: [x] Good [] Fair  [] Poor    Patient Requires Follow-up: [x] Yes  [] No    PLAN: See eval  [x] Continue per plan of care [] Alter current plan (see comments)  [] Plan of care initiated [] Hold pending MD visit [] Discharge    Electronically signed by: Aguila Duncan, PT

## 2018-03-08 NOTE — PROGRESS NOTES
Chief Complaint    Shoulder Pain (TR MRI LEFT SHOULDER)    Follow-up suspected left shoulder capsulitis with mild a.c. arthropathy, impingement suspected cuff tendinopathy review of left shoulder imaging    History of Present Illness:  Monse Nguyen is a 69 y.o. male who is a very pleasant white male retired  who has history of reasonably well controlled type II diabetes and is on Coumadin as he is status post CABG remotely who is a very nice patient of Dr. Thi Gonzalez who is being seen today in time consultation from Dr. Thi Gonzalez for evaluation of ongoing left arm much lesser degree shoulder pain. He states that since November 2017 he noticed the gradual onset of achiness primarily to the mid and lower portion of his upper arm with soreness and stiffness and motion loss involving his left shoulder. There is no actual history of injury or new activity but believes his current symptoms may be related to him climbing up and down from history stands while hunting last fall. There is no actual trauma. He deal of actual shoulder pain but with pain reproduced to the upper arm with extreme internal rotation. He has some limitations of abduction and overhead activity as well. He reports no locking or catching. He describes his arm pain is more achy in nature at 2-3+ but can be quite sharp with internal rotation at 78 out of 10. Reports no substantial neck pain and radicular symptoms numbness or tingling. He has not had a dedicated treatment to date but did see Dr. gonzalez in the office last week who recommended today's orthopedic consultation. No Previous history of shoulder injury. He was seen initially in the office on 1/22/2018 was started on conservative treatment for his left shoulder suspected capsulitis. He presents back today stating that his symptoms have not substantially improved. He has not been symptomatic overall about 4 months but is only 4 weeks into treatment.   He has been person. The patient's mood and affect are appropriate. Lymphatic: The lymphatic examination bilaterally reveals all areas to be without enlargement or induration. Vascular: Examination reveals no swelling or calf tenderness. Peripheral pulses are palpable and 2+. Neurological: The patient has good coordination. There is no weakness or sensory deficit. Shoulder Examination    Inspection:  There is no high-grade deformity or atrophy. He does have mild degenerative changes at the a.c. joint. No tense effusion. Palpation:  He does have some very mild posterior cuff and greater tuberosity tenderness. No substantial a.c. tenderness. Mild tightness to superior rhomboid entrapment. Rang of Motion:  He does have fairly prominent internal rotation deficits. He can only internally rotate to about L4. He does have about a 10-20° reduction in external rotation and discomfort to his arm with abduction beyond 120 and forward flexion beyond 140. Strength:  He does have mild weakness at 4-5 with supra and infraspinatus testing. Subscap 5 minus out of 5. Special Tests:  Pain continues to be reproduced with internal rotation to his arm. He does have pain also reproduced with impingement testing. There does not appear to be any evidence of high-grade instability. Negative labral testing. Negative screening cervical testing. Skin: There are no rashes, ulcerations or lesions. Distal motor sensory and vascular exam is intact. Gait: Fluid smooth gait. Reflexes:  Symmetrically preserved. Additional Comments:        Additional Examinations:  Contralateral Exam: Examination of the right shoulder reveals no atrophy or deformity. The skin is warm and dry. Range of motion is within normal limits. There is no focal tenderness with palpation. No AC joint tenderness. Negative Neer's and Hoskins-Esteban exams. Strength is graded 5/5 throughout.   Cervical exam:The skin is warm

## 2018-03-12 ENCOUNTER — OFFICE VISIT (OUTPATIENT)
Dept: INTERNAL MEDICINE CLINIC | Age: 68
End: 2018-03-12

## 2018-03-12 VITALS
WEIGHT: 181 LBS | SYSTOLIC BLOOD PRESSURE: 122 MMHG | BODY MASS INDEX: 26.53 KG/M2 | DIASTOLIC BLOOD PRESSURE: 60 MMHG | OXYGEN SATURATION: 97 % | HEART RATE: 77 BPM

## 2018-03-12 DIAGNOSIS — M75.42 SHOULDER IMPINGEMENT, LEFT: ICD-10-CM

## 2018-03-12 DIAGNOSIS — I10 ESSENTIAL HYPERTENSION: ICD-10-CM

## 2018-03-12 DIAGNOSIS — E11.9 TYPE 2 DIABETES MELLITUS WITHOUT COMPLICATION, WITHOUT LONG-TERM CURRENT USE OF INSULIN (HCC): Primary | ICD-10-CM

## 2018-03-12 DIAGNOSIS — I25.10 CORONARY ARTERY DISEASE INVOLVING NATIVE CORONARY ARTERY OF NATIVE HEART WITHOUT ANGINA PECTORIS: ICD-10-CM

## 2018-03-12 DIAGNOSIS — M75.02 ADHESIVE CAPSULITIS OF LEFT SHOULDER: ICD-10-CM

## 2018-03-12 DIAGNOSIS — E78.5 HYPERLIPIDEMIA, UNSPECIFIED HYPERLIPIDEMIA TYPE: ICD-10-CM

## 2018-03-12 PROCEDURE — 3045F PR MOST RECENT HEMOGLOBIN A1C LEVEL 7.0-9.0%: CPT | Performed by: INTERNAL MEDICINE

## 2018-03-12 PROCEDURE — 3017F COLORECTAL CA SCREEN DOC REV: CPT | Performed by: INTERNAL MEDICINE

## 2018-03-12 PROCEDURE — 1123F ACP DISCUSS/DSCN MKR DOCD: CPT | Performed by: INTERNAL MEDICINE

## 2018-03-12 PROCEDURE — G8419 CALC BMI OUT NRM PARAM NOF/U: HCPCS | Performed by: INTERNAL MEDICINE

## 2018-03-12 PROCEDURE — G8484 FLU IMMUNIZE NO ADMIN: HCPCS | Performed by: INTERNAL MEDICINE

## 2018-03-12 PROCEDURE — G8598 ASA/ANTIPLAT THER USED: HCPCS | Performed by: INTERNAL MEDICINE

## 2018-03-12 PROCEDURE — G8427 DOCREV CUR MEDS BY ELIG CLIN: HCPCS | Performed by: INTERNAL MEDICINE

## 2018-03-12 PROCEDURE — 99213 OFFICE O/P EST LOW 20 MIN: CPT | Performed by: INTERNAL MEDICINE

## 2018-03-12 PROCEDURE — 4040F PNEUMOC VAC/ADMIN/RCVD: CPT | Performed by: INTERNAL MEDICINE

## 2018-03-12 PROCEDURE — 1036F TOBACCO NON-USER: CPT | Performed by: INTERNAL MEDICINE

## 2018-03-12 RX ORDER — SCOLOPAMINE TRANSDERMAL SYSTEM 1 MG/1
1 PATCH, EXTENDED RELEASE TRANSDERMAL
Qty: 4 PATCH | Refills: 0 | Status: SHIPPED | OUTPATIENT
Start: 2018-03-12 | End: 2018-07-16 | Stop reason: ALTCHOICE

## 2018-03-13 ENCOUNTER — HOSPITAL ENCOUNTER (OUTPATIENT)
Dept: PHYSICAL THERAPY | Age: 68
Discharge: HOME | End: 2018-03-14
Admitting: FAMILY MEDICINE

## 2018-03-13 NOTE — FLOWSHEET NOTE
Home Exercise Program:    [x] (41807) Reviewed/Progressed HEP activities related to strengthening, flexibility, endurance, ROM of scapular, scapulothoracic and UE control with self care, reaching, carrying, lifting, house/yardwork, driving/computer work  [] (33659) Reviewed/Progressed HEP activities related to improving balance, coordination, kinesthetic sense, posture, motor skill, proprioception of scapular, scapulothoracic and UE control with self care, reaching, carrying, lifting, house/yardwork, driving/computer work      Manual Treatments:  PROM / STM / Oscillations-Mobs:  G-I, II, III, IV (PA's, Inf., Post.)  [x] (78494) Provided manual therapy to mobilize soft tissue/joints of cervical/CT, scapular GHJ and UE for the purpose of modulating pain, promoting relaxation,  increasing ROM, reducing/eliminating soft tissue swelling/inflammation/restriction, improving soft tissue extensibility and allowing for proper ROM for normal function with self care, reaching, carrying, lifting, house/yardwork, driving/computer work    Modalities:  15 min PM ESU with CP    Charges:  Timed Code Treatment Minutes: 40   Total Treatment Minutes: 55     [] EVAL (LOW) 62637 (typically 20 minutes face-to-face)  [] EVAL (MOD) 40285 (typically 30 minutes face-to-face)  [] EVAL (HIGH) 78880 (typically 45 minutes face-to-face)  [] RE-EVAL     [x] ZE(37389) x  2   [] IONTO  [] NMR (52594) x      [] VASO  [x] Manual (02630) x  1    [] Other:  [] TA x       [] Mech Traction (92674)  [] ES(attended) (25125)      [x] ES (un) (18427):     GOALS:  Patient stated goal: Pt would like to regain painfree ROM    Therapist goals for Patient:   Short Term Goals: To be achieved in: 2 weeks  1. Independent in HEP and progression per patient tolerance, in order to prevent re-injury. 2. Patient will have a decrease in pain to facilitate improvement in movement, function, and ADLs as indicated by Functional Deficits. Long Term Goals:  To be achieved

## 2018-03-16 ENCOUNTER — HOSPITAL ENCOUNTER (OUTPATIENT)
Dept: PHYSICAL THERAPY | Age: 68
Discharge: HOME | End: 2018-03-17
Admitting: FAMILY MEDICINE

## 2018-03-19 ENCOUNTER — HOSPITAL ENCOUNTER (OUTPATIENT)
Dept: PHYSICAL THERAPY | Age: 68
Discharge: HOME | End: 2018-03-20
Admitting: FAMILY MEDICINE

## 2018-03-19 NOTE — FLOWSHEET NOTE
[facility], [address]  Phone: [phone]  Fax [phone]    Physical Therapy Daily Treatment Note  Date:  3/19/2018    Patient Name:  Scott Fischer    :  1950  MRN: [id number]  Restrictions/Precautions:    Physician Information:  Referring Practitioner: Dr. Earl Schulz  Medical/Treatment Diagnosis Information:  · Diagnosis: M25.512 (ICD-10-CM) - Left shoulder pain, unspecified chronicity,   M79.602 (ICD-10-CM) - Left arm pain,   M75.82 (ICD-10-CM) - Capsulitis of left shoulder  · Treatment Diagnosis: L shoulder pain M25.512,  L shoulder stiffness M25.612,  L shoulder instability M25.312                    [x] Conservative / [] Surgical - DOS:  Therapy Diagnosis/Practice Pattern:  Practice Pattern D: Connective Tissue Dysfunction  Insurance/Certification information:  PT Insurance Information: [employer]  - $40CP - $0DED - PT/OT MED [facility] - 100% - NO AUTH  Plan of care signed: [x] YES  [] NO  Number of Comorbidities:  []0     [x]1-2    []3+  Date of Patient follow up with Physician:     G-Code (if applicable):      Date G-Code Applied:         Progress Note: [x]  Yes  []  No  Next due by: Visit #10        Latex Allergy:  [x]NO      []YES  Preferred Language for Healthcare:   [x]English       []other:    Visit # Insurance Allowable Reporting Period   11 BMN Begin Date: 3/19/2018               End Date:      RECERT DUE BY:      SUBJECTIVE:  Pt reports shoulder feels about the same. Pt feels he is tighter today with reaching behind back. Not noticing much change. Pt educated at length on MRI results, stages of frozen shoulder, therapy progression; pt skeptical of having frozen shoulder but verbalizes understanding.       OBJECTIVE:   Observation: Improved scapular mechanics, increased IR with decreased symptoms  Palpation:     Test used Initial score Current Score   Pain Summary VAS 9 6-7   Functional questionnaire
IR                           ER                           No money OVL 10x3\" OVL 10x3\"                         Horiz. ABd                           Biceps                           Triceps                           Punches          Cable Column   Rows                                Ext.                                 Lat. Pulldown                                Biceps                                Triceps          Modality declined CP 10'   Initials JLW JLW   Time spent with PT assistant

## 2018-03-22 ENCOUNTER — HOSPITAL ENCOUNTER (OUTPATIENT)
Dept: PHYSICAL THERAPY | Age: 68
Discharge: HOME | End: 2018-03-23
Admitting: FAMILY MEDICINE

## 2018-03-22 NOTE — FLOWSHEET NOTE
RESTRICTIONS/PRECAUTIONS:     Exercises/Interventions:   Therapeutic Ex Sets/reps Notes             Supine ER behind head stretch 3 x 30\" HEP   SL ER  SBS  No $ 2 x 10  1 x 10  2 x 10 3# HEP  HEP  HEP RTB   Supine Sa, R.S. Up/down, s/s 2 x 10 4#   Prone row/ext 2 x 10 4#   TB Row/ext 2 x 10 HEP GTB   TB ER/IR 20x RTB   Sleeper stretch  IR strap stretch 3 x 30\"  3 x 20\" Added to HEP   Finisher 20x Up/down, ladder, CW/CCW   Wall push ups with scap squeeze 2 x 10    Forward flexion/ABD TB 2 x 10 YTB   D2 flexion up/down TB 2 x 10 YTB   Seated flexion/scaption 1 x 10 2#   True stretch:  B arms FF, 3D hip drives w/scap mobs  Pec stretch: kept low 40-60° abd 5x10\"  ER stretch (0° abd) 5x10    OVL 3 ways  10x bilateral        SEE ATC note 3/19/18                    Manual Intervention     GH mobs grade 1-2 PA/inf, scap mobs 8 min    Shoulder PROM 15 min    STM and CPA grades 1-2 cervical spine     STM, RTC insertions, posterior scapula and scap stabilizers                    NMR re-education                                                 Therapeutic Exercise and NMR EXR  [x] (50533) Provided verbal/tactile cueing for activities related to strengthening, flexibility, endurance, ROM  for improvements in scapular, scapulothoracic and UE control with self care, reaching, carrying, lifting, house/yardwork, driving/computer work.    [] (29995) Provided verbal/tactile cueing for activities related to improving balance, coordination, kinesthetic sense, posture, motor skill, proprioception  to assist with  scapular, scapulothoracic and UE control with self care, reaching, carrying, lifting, house/yardwork, driving/computer work.     Therapeutic Activities:    [] (73248 or 06908) Provided verbal/tactile cueing for activities related to improving balance, coordination, kinesthetic sense, posture, motor skill, proprioception and motor activation to allow for proper function of scapular, scapulothoracic and UE control with self care,

## 2018-03-27 ENCOUNTER — HOSPITAL ENCOUNTER (OUTPATIENT)
Dept: PHYSICAL THERAPY | Age: 68
Discharge: HOME | End: 2018-03-28
Admitting: FAMILY MEDICINE

## 2018-03-27 NOTE — FLOWSHEET NOTE
Deficits. Long Term Goals: To be achieved in: 8 weeks  1. Disability index score of 10% or less for the [facility] to assist with reaching prior level of function. 2. Patient will demonstrate increased AROM to IR equal bilaterally to allow for proper joint functioning as indicated by patients Functional Deficits. 3. Patient will demonstrate an increase in Strength to 4+/5 IR to allow for proper functional mobility as indicated by patients Functional Deficits. 4. Patient will return to be able to tuck shirt in/put belt on without increased symptoms or restriction. New or Updated Goals (if applicable):  [x] No change to goals established upon initial eval/last progress note:  New Goals:    Progression Towards Functional goals:   [x] Patient is progressing as expected towards functional goals listed. [] Progression is slowed due to complexities listed. [] Progression has been slowed due to co-morbidities. [] Plan just implemented, too soon to assess goals progression  [] Other:     ASSESSMENT:    [] Improvement noted relative to goals:  [] No Improvement noted related to goals:  Summary/Patient's response to treatment: Patient demonstrating slight improvement of ROM with PROM, painful/guarded end point but getting further before reaching point where patient reports pain.     Treatment/Activity Tolerance:  [x] Patient tolerated treatment well [] Patient limited by fatique  [] Patient limited by pain  [] Patient limited by other medical complications  [] Other:     Prognosis: [x] Good [] Fair  [] Poor    Patient Requires Follow-up: [x] Yes  [] No    PLAN: See eval  [x] Continue per plan of care [] Alter current plan (see comments)  [] Plan of care initiated [] Hold pending MD visit [] Discharge    Electronically signed by: Bill Hair PT

## 2018-03-30 ENCOUNTER — HOSPITAL ENCOUNTER (OUTPATIENT)
Dept: PHYSICAL THERAPY | Age: 68
Discharge: HOME | End: 2018-03-31
Admitting: FAMILY MEDICINE

## 2018-04-01 ENCOUNTER — HOSPITAL ENCOUNTER (OUTPATIENT)
Dept: PHYSICAL THERAPY | Age: 68
Discharge: HOME | End: 2018-04-30
Attending: FAMILY MEDICINE | Admitting: FAMILY MEDICINE

## 2018-04-03 ENCOUNTER — HOSPITAL ENCOUNTER (OUTPATIENT)
Dept: PHYSICAL THERAPY | Age: 68
Discharge: HOME | End: 2018-04-04
Admitting: FAMILY MEDICINE

## 2018-04-13 ENCOUNTER — HOSPITAL ENCOUNTER (OUTPATIENT)
Dept: PHYSICAL THERAPY | Age: 68
Discharge: HOME | End: 2018-04-14
Admitting: FAMILY MEDICINE

## 2018-04-20 ENCOUNTER — HOSPITAL ENCOUNTER (OUTPATIENT)
Dept: PHYSICAL THERAPY | Age: 68
Discharge: HOME | End: 2018-04-21
Admitting: FAMILY MEDICINE

## 2018-04-23 RX ORDER — BLOOD SUGAR DIAGNOSTIC
STRIP MISCELLANEOUS
Qty: 100 STRIP | Refills: 12 | Status: SHIPPED | OUTPATIENT
Start: 2018-04-23 | End: 2019-07-30 | Stop reason: SDUPTHER

## 2018-05-01 ENCOUNTER — HOSPITAL ENCOUNTER (OUTPATIENT)
Dept: PHYSICAL THERAPY | Age: 68
Discharge: HOME | End: 2018-05-31
Attending: FAMILY MEDICINE | Admitting: FAMILY MEDICINE

## 2018-05-02 ENCOUNTER — HOSPITAL ENCOUNTER (OUTPATIENT)
Dept: PHYSICAL THERAPY | Age: 68
Discharge: HOME | End: 2018-05-03
Admitting: FAMILY MEDICINE

## 2018-05-02 ENCOUNTER — OFFICE VISIT (OUTPATIENT)
Dept: ORTHOPEDIC SURGERY | Age: 68
End: 2018-05-02

## 2018-05-02 VITALS — HEIGHT: 69 IN | WEIGHT: 181 LBS | BODY MASS INDEX: 26.81 KG/M2

## 2018-05-02 DIAGNOSIS — M25.512 ACUTE PAIN OF LEFT SHOULDER: ICD-10-CM

## 2018-05-02 DIAGNOSIS — M75.02 ADHESIVE CAPSULITIS OF LEFT SHOULDER: Primary | ICD-10-CM

## 2018-05-02 DIAGNOSIS — M67.912 TENDINOPATHY OF LEFT ROTATOR CUFF: ICD-10-CM

## 2018-05-02 PROCEDURE — G8417 CALC BMI ABV UP PARAM F/U: HCPCS | Performed by: FAMILY MEDICINE

## 2018-05-02 PROCEDURE — G8427 DOCREV CUR MEDS BY ELIG CLIN: HCPCS | Performed by: FAMILY MEDICINE

## 2018-05-02 PROCEDURE — 99213 OFFICE O/P EST LOW 20 MIN: CPT | Performed by: FAMILY MEDICINE

## 2018-05-02 PROCEDURE — G8598 ASA/ANTIPLAT THER USED: HCPCS | Performed by: FAMILY MEDICINE

## 2018-05-02 PROCEDURE — 4040F PNEUMOC VAC/ADMIN/RCVD: CPT | Performed by: FAMILY MEDICINE

## 2018-05-02 PROCEDURE — 3017F COLORECTAL CA SCREEN DOC REV: CPT | Performed by: FAMILY MEDICINE

## 2018-05-02 PROCEDURE — 1036F TOBACCO NON-USER: CPT | Performed by: FAMILY MEDICINE

## 2018-05-02 PROCEDURE — 1123F ACP DISCUSS/DSCN MKR DOCD: CPT | Performed by: FAMILY MEDICINE

## 2018-05-30 ENCOUNTER — HOSPITAL ENCOUNTER (OUTPATIENT)
Dept: PHYSICAL THERAPY | Age: 68
Discharge: HOME | End: 2018-06-21
Admitting: FAMILY MEDICINE

## 2018-06-01 ENCOUNTER — HOSPITAL ENCOUNTER (OUTPATIENT)
Dept: PHYSICAL THERAPY | Age: 68
Discharge: HOME | End: 2018-06-01
Attending: FAMILY MEDICINE | Admitting: FAMILY MEDICINE

## 2018-06-20 ENCOUNTER — HOSPITAL ENCOUNTER (OUTPATIENT)
Dept: PHYSICAL THERAPY | Age: 68
Discharge: HOME | End: 2018-06-21
Admitting: FAMILY MEDICINE

## 2018-07-16 ENCOUNTER — OFFICE VISIT (OUTPATIENT)
Dept: INTERNAL MEDICINE CLINIC | Age: 68
End: 2018-07-16

## 2018-07-16 VITALS
BODY MASS INDEX: 26.68 KG/M2 | DIASTOLIC BLOOD PRESSURE: 60 MMHG | OXYGEN SATURATION: 98 % | HEART RATE: 63 BPM | SYSTOLIC BLOOD PRESSURE: 122 MMHG | WEIGHT: 182 LBS

## 2018-07-16 DIAGNOSIS — M77.8 CAPSULITIS OF LEFT SHOULDER: ICD-10-CM

## 2018-07-16 DIAGNOSIS — I10 ESSENTIAL HYPERTENSION: ICD-10-CM

## 2018-07-16 DIAGNOSIS — E78.5 HYPERLIPIDEMIA, UNSPECIFIED HYPERLIPIDEMIA TYPE: ICD-10-CM

## 2018-07-16 DIAGNOSIS — M19.90 OSTEOARTHRITIS, UNSPECIFIED OSTEOARTHRITIS TYPE, UNSPECIFIED SITE: ICD-10-CM

## 2018-07-16 DIAGNOSIS — E11.9 TYPE 2 DIABETES MELLITUS WITHOUT COMPLICATION, WITHOUT LONG-TERM CURRENT USE OF INSULIN (HCC): Primary | ICD-10-CM

## 2018-07-16 DIAGNOSIS — I25.10 CORONARY ARTERY DISEASE INVOLVING NATIVE CORONARY ARTERY OF NATIVE HEART WITHOUT ANGINA PECTORIS: ICD-10-CM

## 2018-07-16 PROCEDURE — 1123F ACP DISCUSS/DSCN MKR DOCD: CPT | Performed by: INTERNAL MEDICINE

## 2018-07-16 PROCEDURE — G8417 CALC BMI ABV UP PARAM F/U: HCPCS | Performed by: INTERNAL MEDICINE

## 2018-07-16 PROCEDURE — 3045F PR MOST RECENT HEMOGLOBIN A1C LEVEL 7.0-9.0%: CPT | Performed by: INTERNAL MEDICINE

## 2018-07-16 PROCEDURE — 2022F DILAT RTA XM EVC RTNOPTHY: CPT | Performed by: INTERNAL MEDICINE

## 2018-07-16 PROCEDURE — 1101F PT FALLS ASSESS-DOCD LE1/YR: CPT | Performed by: INTERNAL MEDICINE

## 2018-07-16 PROCEDURE — 99214 OFFICE O/P EST MOD 30 MIN: CPT | Performed by: INTERNAL MEDICINE

## 2018-07-16 PROCEDURE — 3017F COLORECTAL CA SCREEN DOC REV: CPT | Performed by: INTERNAL MEDICINE

## 2018-07-16 PROCEDURE — G8427 DOCREV CUR MEDS BY ELIG CLIN: HCPCS | Performed by: INTERNAL MEDICINE

## 2018-07-16 PROCEDURE — 4040F PNEUMOC VAC/ADMIN/RCVD: CPT | Performed by: INTERNAL MEDICINE

## 2018-07-16 PROCEDURE — 1036F TOBACCO NON-USER: CPT | Performed by: INTERNAL MEDICINE

## 2018-07-16 PROCEDURE — G8598 ASA/ANTIPLAT THER USED: HCPCS | Performed by: INTERNAL MEDICINE

## 2018-07-16 NOTE — PROGRESS NOTES
Subjective:      Patient ID: Karl Chance is a 77 y.o. male. CC: DM 2  HPI: Patient with DM 2, HTN, hyperlipidemia, CAD.  5/2/2018 left shoulder capsulitis sent for physical therapy which has been improved. Concerning diabetes glucose fingersticks elevated after steroid injection for left shoulder capsulitis. At that time increased Amaryl. Glucose readings have return to baseline and he is decreased Amaryl dose taking 1/2 pill twice a day. Medicines and allergies reviewed  Health maintenance reviewed  Family history reviewed. Social history reviewed  Reviewed laboratory data 7/13/2018: Chemistry panel: Glucose: 126. Calcium: 10.3. Lipid panel: Total cholesterol: 118. LDL cholesterol: 55.  Hemoglobin A1c 7.5. PSA 3.1. Weight: About the same. Review of Systems       Review of Systems   Constitutional: negative   HENT: negative   EYES: negative   Respiratory: negative   Gastrointestinal: negative   Endocrine: negative   Musculoskeletal: Left shoulder capsulitis: improvement  Skin: negative   Allergic/Immunological: negative   Hematological: negative   Psychiatric/Behavorial: negative   CV: negative   CNS: negative   :Negative   S/E:Negative  Renal: Negative      Objective:   Physical Exam : Lungs: Clear to auscultation. No wheezing. CV: S1-S2 normal.  JAZZY. Carotid: Good upstroke no bruit. Head/neck: Neck: No lymphadenopathy. Thyroid: Not palpable and not tender to touch. Spine/extremities: No ankle edema. Skin: No rash. CNS:Patient's alert, cooperative, moves all 4 limbs, ambulates without difficulty, no slurred speech, no facial droop, good orientation. Good historian. Diabetic Foot Exam: Pulses easily palpable. No edema. Light touch and monofilament test normal.  No open areas. Blood pressure 122/60, pulse 63, weight 182 lb (82.6 kg), SpO2 98 %. Assessment:       1.  DM 2: Stable  2. HTN: Stable  3. Lipids: Stable  4. CAD: Baseline  5.   Left shoulder capsulitis physical therapy and improvement noted         Plan:      1. Continue present medicines  2.   Gave slip to obtain fasting laboratory studies before next office visit  Return follow-up  Dr. Butler Govern

## 2018-07-17 LAB
CATARACTS: POSITIVE
DIABETIC RETINOPATHY: NEGATIVE
GLAUCOMA: NEGATIVE
INTRAOCULAR PRESSURE EYE: NORMAL
VISUAL ACUITY DISTANCE LEFT EYE: NORMAL
VISUAL ACUITY DISTANCE RIGHT EYE: NORMAL

## 2018-12-29 DIAGNOSIS — E11.9 TYPE 2 DIABETES MELLITUS WITHOUT COMPLICATION, WITHOUT LONG-TERM CURRENT USE OF INSULIN (HCC): ICD-10-CM

## 2018-12-30 RX ORDER — GLIMEPIRIDE 1 MG/1
TABLET ORAL
Qty: 90 TABLET | Refills: 3 | Status: SHIPPED | OUTPATIENT
Start: 2018-12-30 | End: 2019-12-11 | Stop reason: SDUPTHER

## 2019-01-14 ENCOUNTER — OFFICE VISIT (OUTPATIENT)
Dept: INTERNAL MEDICINE CLINIC | Age: 69
End: 2019-01-14
Payer: MEDICARE

## 2019-01-14 VITALS
OXYGEN SATURATION: 98 % | BODY MASS INDEX: 26.68 KG/M2 | WEIGHT: 182 LBS | DIASTOLIC BLOOD PRESSURE: 66 MMHG | SYSTOLIC BLOOD PRESSURE: 130 MMHG | HEART RATE: 66 BPM

## 2019-01-14 DIAGNOSIS — K21.9 GASTROESOPHAGEAL REFLUX DISEASE WITHOUT ESOPHAGITIS: ICD-10-CM

## 2019-01-14 DIAGNOSIS — E78.5 HYPERLIPIDEMIA, UNSPECIFIED HYPERLIPIDEMIA TYPE: ICD-10-CM

## 2019-01-14 DIAGNOSIS — N18.30 STAGE 3 CHRONIC KIDNEY DISEASE (HCC): ICD-10-CM

## 2019-01-14 DIAGNOSIS — Z95.2 H/O MECHANICAL AORTIC VALVE REPLACEMENT: ICD-10-CM

## 2019-01-14 DIAGNOSIS — M19.90 OSTEOARTHRITIS, UNSPECIFIED OSTEOARTHRITIS TYPE, UNSPECIFIED SITE: ICD-10-CM

## 2019-01-14 DIAGNOSIS — Z95.1 HX OF CABG: ICD-10-CM

## 2019-01-14 DIAGNOSIS — E11.9 TYPE 2 DIABETES MELLITUS WITHOUT COMPLICATION, WITHOUT LONG-TERM CURRENT USE OF INSULIN (HCC): Primary | ICD-10-CM

## 2019-01-14 DIAGNOSIS — I25.10 CORONARY ARTERY DISEASE INVOLVING NATIVE CORONARY ARTERY OF NATIVE HEART WITHOUT ANGINA PECTORIS: ICD-10-CM

## 2019-01-14 PROCEDURE — 3045F PR MOST RECENT HEMOGLOBIN A1C LEVEL 7.0-9.0%: CPT | Performed by: INTERNAL MEDICINE

## 2019-01-14 PROCEDURE — 1123F ACP DISCUSS/DSCN MKR DOCD: CPT | Performed by: INTERNAL MEDICINE

## 2019-01-14 PROCEDURE — 2022F DILAT RTA XM EVC RTNOPTHY: CPT | Performed by: INTERNAL MEDICINE

## 2019-01-14 PROCEDURE — G8417 CALC BMI ABV UP PARAM F/U: HCPCS | Performed by: INTERNAL MEDICINE

## 2019-01-14 PROCEDURE — G8598 ASA/ANTIPLAT THER USED: HCPCS | Performed by: INTERNAL MEDICINE

## 2019-01-14 PROCEDURE — 4040F PNEUMOC VAC/ADMIN/RCVD: CPT | Performed by: INTERNAL MEDICINE

## 2019-01-14 PROCEDURE — G8427 DOCREV CUR MEDS BY ELIG CLIN: HCPCS | Performed by: INTERNAL MEDICINE

## 2019-01-14 PROCEDURE — 99213 OFFICE O/P EST LOW 20 MIN: CPT | Performed by: INTERNAL MEDICINE

## 2019-01-14 PROCEDURE — G8484 FLU IMMUNIZE NO ADMIN: HCPCS | Performed by: INTERNAL MEDICINE

## 2019-01-14 PROCEDURE — 1036F TOBACCO NON-USER: CPT | Performed by: INTERNAL MEDICINE

## 2019-01-14 PROCEDURE — 1101F PT FALLS ASSESS-DOCD LE1/YR: CPT | Performed by: INTERNAL MEDICINE

## 2019-01-14 PROCEDURE — 3017F COLORECTAL CA SCREEN DOC REV: CPT | Performed by: INTERNAL MEDICINE

## 2019-03-07 DIAGNOSIS — I25.10 CORONARY ARTERY DISEASE INVOLVING NATIVE CORONARY ARTERY OF NATIVE HEART WITHOUT ANGINA PECTORIS: ICD-10-CM

## 2019-03-08 RX ORDER — LISINOPRIL 10 MG/1
TABLET ORAL
Qty: 90 TABLET | Refills: 3 | Status: SHIPPED | OUTPATIENT
Start: 2019-03-08 | End: 2020-02-13

## 2019-03-08 RX ORDER — PRAVASTATIN SODIUM 80 MG/1
TABLET ORAL
Qty: 90 TABLET | Refills: 3 | Status: SHIPPED | OUTPATIENT
Start: 2019-03-08 | End: 2020-02-13

## 2019-03-23 RX ORDER — FENOFIBRATE 160 MG/1
TABLET ORAL
Qty: 90 TABLET | Refills: 3 | Status: SHIPPED | OUTPATIENT
Start: 2019-03-23 | End: 2020-02-13

## 2019-05-30 ENCOUNTER — TELEPHONE (OUTPATIENT)
Dept: INTERNAL MEDICINE CLINIC | Age: 69
End: 2019-05-30

## 2019-05-31 DIAGNOSIS — R19.7 DIARRHEA, UNSPECIFIED TYPE: Primary | ICD-10-CM

## 2019-05-31 RX ORDER — SCOLOPAMINE TRANSDERMAL SYSTEM 1 MG/1
1 PATCH, EXTENDED RELEASE TRANSDERMAL
Qty: 4 PATCH | Refills: 0 | Status: ON HOLD | OUTPATIENT
Start: 2019-05-31 | End: 2019-07-04 | Stop reason: HOSPADM

## 2019-05-31 RX ORDER — DIPHENOXYLATE HYDROCHLORIDE AND ATROPINE SULFATE 2.5; .025 MG/1; MG/1
1 TABLET ORAL 4 TIMES DAILY PRN
Qty: 15 TABLET | Refills: 0 | Status: SHIPPED | OUTPATIENT
Start: 2019-05-31 | End: 2019-06-10

## 2019-06-05 ENCOUNTER — TELEPHONE (OUTPATIENT)
Dept: FAMILY MEDICINE CLINIC | Age: 69
End: 2019-06-05

## 2019-06-06 NOTE — TELEPHONE ENCOUNTER
Received APPROVAL for Transderm Scop (1.5 MG) 1MG/3DAYS 72 hr patches until 12/31/2019. Please advise patient. Thank you.

## 2019-07-03 ENCOUNTER — APPOINTMENT (OUTPATIENT)
Dept: CT IMAGING | Age: 69
End: 2019-07-03
Payer: MEDICARE

## 2019-07-03 ENCOUNTER — HOSPITAL ENCOUNTER (OUTPATIENT)
Age: 69
Setting detail: OBSERVATION
Discharge: HOME | End: 2019-07-04
Attending: EMERGENCY MEDICINE | Admitting: INTERNAL MEDICINE
Payer: MEDICARE

## 2019-07-03 DIAGNOSIS — R00.1 BRADYCARDIA: ICD-10-CM

## 2019-07-03 DIAGNOSIS — I65.23 BILATERAL CAROTID ARTERY STENOSIS: ICD-10-CM

## 2019-07-03 DIAGNOSIS — R42 DIZZINESS: Primary | ICD-10-CM

## 2019-07-03 DIAGNOSIS — R53.83 MALAISE AND FATIGUE: ICD-10-CM

## 2019-07-03 DIAGNOSIS — R53.81 MALAISE AND FATIGUE: ICD-10-CM

## 2019-07-03 PROBLEM — R11.2 NAUSEA & VOMITING: Status: ACTIVE | Noted: 2019-07-03

## 2019-07-03 LAB
A/G RATIO: 1.7 (ref 1.1–2.2)
ALBUMIN SERPL-MCNC: 4.3 G/DL (ref 3.4–5)
ALP BLD-CCNC: 45 U/L (ref 40–129)
ALT SERPL-CCNC: 13 U/L (ref 10–40)
ANION GAP SERPL CALCULATED.3IONS-SCNC: 13 MMOL/L (ref 3–16)
AST SERPL-CCNC: 13 U/L (ref 15–37)
BASOPHILS ABSOLUTE: 0 K/UL (ref 0–0.2)
BASOPHILS RELATIVE PERCENT: 0.1 %
BILIRUB SERPL-MCNC: 0.4 MG/DL (ref 0–1)
BUN BLDV-MCNC: 16 MG/DL (ref 7–20)
CALCIUM SERPL-MCNC: 10.4 MG/DL (ref 8.3–10.6)
CHLORIDE BLD-SCNC: 105 MMOL/L (ref 99–110)
CO2: 22 MMOL/L (ref 21–32)
CREAT SERPL-MCNC: 1 MG/DL (ref 0.8–1.3)
EOSINOPHILS ABSOLUTE: 0.1 K/UL (ref 0–0.6)
EOSINOPHILS RELATIVE PERCENT: 0.9 %
GFR AFRICAN AMERICAN: >60
GFR NON-AFRICAN AMERICAN: >60
GLOBULIN: 2.5 G/DL
GLUCOSE BLD-MCNC: 185 MG/DL (ref 70–99)
HCT VFR BLD CALC: 41.8 % (ref 40.5–52.5)
HEMOGLOBIN: 14.3 G/DL (ref 13.5–17.5)
INR BLD: 3.44 (ref 0.86–1.14)
LYMPHOCYTES ABSOLUTE: 1.7 K/UL (ref 1–5.1)
LYMPHOCYTES RELATIVE PERCENT: 18.6 %
MAGNESIUM: 2 MG/DL (ref 1.8–2.4)
MCH RBC QN AUTO: 30.1 PG (ref 26–34)
MCHC RBC AUTO-ENTMCNC: 34.1 G/DL (ref 31–36)
MCV RBC AUTO: 88.4 FL (ref 80–100)
MONOCYTES ABSOLUTE: 0.5 K/UL (ref 0–1.3)
MONOCYTES RELATIVE PERCENT: 5.4 %
NEUTROPHILS ABSOLUTE: 7 K/UL (ref 1.7–7.7)
NEUTROPHILS RELATIVE PERCENT: 75 %
PDW BLD-RTO: 13.9 % (ref 12.4–15.4)
PLATELET # BLD: 289 K/UL (ref 135–450)
PMV BLD AUTO: 7.5 FL (ref 5–10.5)
POTASSIUM SERPL-SCNC: 3.8 MMOL/L (ref 3.5–5.1)
PROTHROMBIN TIME: 39.2 SEC (ref 9.8–13)
RBC # BLD: 4.73 M/UL (ref 4.2–5.9)
SODIUM BLD-SCNC: 140 MMOL/L (ref 136–145)
TOTAL CK: 42 U/L (ref 39–308)
TOTAL PROTEIN: 6.8 G/DL (ref 6.4–8.2)
TROPONIN: <0.01 NG/ML
WBC # BLD: 9.3 K/UL (ref 4–11)

## 2019-07-03 PROCEDURE — 96374 THER/PROPH/DIAG INJ IV PUSH: CPT

## 2019-07-03 PROCEDURE — 70496 CT ANGIOGRAPHY HEAD: CPT

## 2019-07-03 PROCEDURE — 83735 ASSAY OF MAGNESIUM: CPT

## 2019-07-03 PROCEDURE — 70450 CT HEAD/BRAIN W/O DYE: CPT

## 2019-07-03 PROCEDURE — 6360000004 HC RX CONTRAST MEDICATION: Performed by: EMERGENCY MEDICINE

## 2019-07-03 PROCEDURE — 6360000002 HC RX W HCPCS: Performed by: PHYSICIAN ASSISTANT

## 2019-07-03 PROCEDURE — 6370000000 HC RX 637 (ALT 250 FOR IP): Performed by: EMERGENCY MEDICINE

## 2019-07-03 PROCEDURE — 99285 EMERGENCY DEPT VISIT HI MDM: CPT

## 2019-07-03 PROCEDURE — 93005 ELECTROCARDIOGRAM TRACING: CPT | Performed by: EMERGENCY MEDICINE

## 2019-07-03 PROCEDURE — 80053 COMPREHEN METABOLIC PANEL: CPT

## 2019-07-03 PROCEDURE — 85025 COMPLETE CBC W/AUTO DIFF WBC: CPT

## 2019-07-03 PROCEDURE — 85610 PROTHROMBIN TIME: CPT

## 2019-07-03 PROCEDURE — 2580000003 HC RX 258: Performed by: PHYSICIAN ASSISTANT

## 2019-07-03 PROCEDURE — 84484 ASSAY OF TROPONIN QUANT: CPT

## 2019-07-03 PROCEDURE — 96375 TX/PRO/DX INJ NEW DRUG ADDON: CPT

## 2019-07-03 PROCEDURE — 96361 HYDRATE IV INFUSION ADD-ON: CPT

## 2019-07-03 PROCEDURE — 6360000002 HC RX W HCPCS: Performed by: EMERGENCY MEDICINE

## 2019-07-03 PROCEDURE — 93005 ELECTROCARDIOGRAM TRACING: CPT | Performed by: PHYSICIAN ASSISTANT

## 2019-07-03 PROCEDURE — 82550 ASSAY OF CK (CPK): CPT

## 2019-07-03 PROCEDURE — 36415 COLL VENOUS BLD VENIPUNCTURE: CPT

## 2019-07-03 RX ORDER — AMOXICILLIN 500 MG
1200 CAPSULE ORAL DAILY
COMMUNITY

## 2019-07-03 RX ORDER — ONDANSETRON 2 MG/ML
4 INJECTION INTRAMUSCULAR; INTRAVENOUS ONCE
Status: COMPLETED | OUTPATIENT
Start: 2019-07-03 | End: 2019-07-03

## 2019-07-03 RX ORDER — MECLIZINE HCL 12.5 MG/1
25 TABLET ORAL ONCE
Status: COMPLETED | OUTPATIENT
Start: 2019-07-03 | End: 2019-07-03

## 2019-07-03 RX ORDER — 0.9 % SODIUM CHLORIDE 0.9 %
1000 INTRAVENOUS SOLUTION INTRAVENOUS ONCE
Status: COMPLETED | OUTPATIENT
Start: 2019-07-03 | End: 2019-07-03

## 2019-07-03 RX ORDER — METOCLOPRAMIDE HYDROCHLORIDE 5 MG/ML
10 INJECTION INTRAMUSCULAR; INTRAVENOUS ONCE
Status: COMPLETED | OUTPATIENT
Start: 2019-07-03 | End: 2019-07-03

## 2019-07-03 RX ADMIN — METOCLOPRAMIDE 10 MG: 5 INJECTION, SOLUTION INTRAMUSCULAR; INTRAVENOUS at 19:55

## 2019-07-03 RX ADMIN — SODIUM CHLORIDE 1000 ML: 9 INJECTION, SOLUTION INTRAVENOUS at 19:55

## 2019-07-03 RX ADMIN — IOPAMIDOL 75 ML: 755 INJECTION, SOLUTION INTRAVENOUS at 21:54

## 2019-07-03 RX ADMIN — ONDANSETRON 4 MG: 2 INJECTION INTRAMUSCULAR; INTRAVENOUS at 19:55

## 2019-07-03 RX ADMIN — MECLIZINE 25 MG: 12.5 TABLET ORAL at 19:55

## 2019-07-03 ASSESSMENT — ENCOUNTER SYMPTOMS
EYES NEGATIVE: 1
COLOR CHANGE: 0
SHORTNESS OF BREATH: 0
DIARRHEA: 0
VOMITING: 1
NAUSEA: 1
ABDOMINAL PAIN: 0
COUGH: 0

## 2019-07-03 NOTE — ED PROVIDER NOTES
HEAD: ANTERIOR CIRCULATION: Fibrocalcific plaque is noted in the internal carotid arteries without evidence of a flow-limiting stenosis. The anterior and middle cerebral arteries are unremarkable. POSTERIOR CIRCULATION: The basilar artery is normal in appearance. The posterior cerebral arteries are unremarkable. BRAIN: No areas of abnormal contrast enhancement. No midline shift. 1. There is a 30% stenosis in the proximal right internal carotid artery and a 50% stenosis in the proximal left internal carotid artery. 2. Otherwise unremarkable CTA of the head and neck. EKG:      See interpretation by Chanel Mane DO. PROCEDURES:   N/A    CRITICAL CARE TIME:       None    CONSULTS:  IP CONSULT TO HOSPITALIST      EMERGENCY DEPARTMENT COURSE and DIFFERENTIAL DIAGNOSIS/MDM:   Vitals:    Vitals:    07/03/19 1845 07/03/19 2024 07/03/19 2154 07/03/19 2200   BP: (!) 176/78 (!) 145/54  (!) 145/72   Pulse: 64 52  65   Resp: 14 14  12   Temp: 98.1 °F (36.7 °C)      TempSrc: Oral      SpO2: 97% 98% 94% 97%   Weight: 170 lb (77.1 kg)      Height: 5' 9\" (1.753 m)          Patient was given the following medications:  Medications   0.9 % sodium chloride bolus (0 mLs Intravenous Stopped 7/3/19 2235)   ondansetron (ZOFRAN) injection 4 mg (4 mg Intravenous Given 7/3/19 1955)   metoclopramide (REGLAN) injection 10 mg (10 mg Intravenous Given 7/3/19 1955)   meclizine (ANTIVERT) tablet 25 mg (25 mg Oral Given 7/3/19 1955)   iopamidol (ISOVUE-370) 76 % injection 75 mL (75 mLs Intravenous Given 7/3/19 2154)         Patient was evaluated by both myself and Chanel Mane DO. The patient just got back from a 2-week cruise and since yesterday has been feeling generally fatigued, tired, weak, nauseous, dizzy. He states he \"has not felt this bad since his open heart surgery\". He denies any specific pain in his symptoms are very vague. His initial vital signs are okay. His EKG shows sinus bradycardia in the 50s.   The patient's been on the cardiac monitor throughout his time and at one point his heart rate dropped into the 30s though quickly bounced back to the 40s. His CBC is normal.  His CMP reveals hyperglycemia at 185 but is otherwise normal.  His magnesium is normal.  His CK is normal.  His troponin is negative. PT is 39.2 and INR is 3.44. His CT head is normal.  His CTA head and neck shows a 30% stenosis in the proximal right internal carotid artery and a 50% stenosis in the proximal left internal carotid artery. His CTA is otherwise normal.  The patient received medication for symptoms in the ED including a liter normal saline IV, Zofran 4 mg IV, Reglan 10 mg IV and meclizine 25 mg orally. His dizziness and nausea are better, but he still describes feeling very weak. At this point, given the patient's age, past medical history and symptoms along with her documented bradycardia in the ED, we will recommend admission. I spoke with Dr. Ankur Daly. We thoroughly discussed the history, physical exam, laboratory and imaging studies, as well as, emergency department course. Based upon that discussion, we've decided to admit Dewayne Norwood for further observation and evaluation of [facility] Luiz's dizziness with bradycardia. As I have deemed necessary from their history, physical and studies, I have considered and evaluated Dewayne Norwood for the following diagnoses:  SEPSIS, MENINGITIS, DIABETIC COMPLICATIONS, INTRACRANIAL HEMORRHAGE, SUBARACHNOID HEMORRHAGE, SUBDURAL HEMATOMA, & STROKE. FINAL IMPRESSION:      1. Dizziness    2. Bradycardia    3. Malaise and fatigue    4.  Bilateral carotid artery stenosis          DISPOSITION/PLAN:   DISPOSITION     ADMIT             (Please note thatportions of this note were completed with a voice recognition program.  Efforts were made to edit the dictations, but occasionally words are mis-transcribed.)    Andreina Paredes PA-C (electronicallysigned)              [place],

## 2019-07-03 NOTE — ED PROVIDER NOTES
left internal carotid artery using NASCET criteria. The left common carotid artery is patent. VERTEBRAL ARTERIES: The vertebral arteries are patent without evidence of a flow-limiting stenosis or dissection. SOFT TISSUES: The parapharyngeal fat spaces are preserved. The base of the tongue, vallecula, and true vocal cords are unremarkable. There is a low-attenuation nodule in the right lobe of the thyroid gland measuring 1.2 cm, benign. No follow-up imaging is required. No superior mediastinal adenopathy. The lung apices are clear. There is no cervical adenopathy. BONES: There are maxillary mucous retention cysts. There is scattered trace paranasal sinus disease. The mastoid air cells are clear. Degenerative changes are noted in the spine. Median sternotomy wires are noted. CTA HEAD: ANTERIOR CIRCULATION: Fibrocalcific plaque is noted in the internal carotid arteries without evidence of a flow-limiting stenosis. The anterior and middle cerebral arteries are unremarkable. POSTERIOR CIRCULATION: The basilar artery is normal in appearance. The posterior cerebral arteries are unremarkable. BRAIN: No areas of abnormal contrast enhancement. No midline shift. 1. There is a 30% stenosis in the proximal right internal carotid artery and a 50% stenosis in the proximal left internal carotid artery. 2. Otherwise unremarkable CTA of the head and neck.         Whitley Garcia DO  07/04/19 1523

## 2019-07-04 VITALS
TEMPERATURE: 97.4 F | RESPIRATION RATE: 16 BRPM | BODY MASS INDEX: 25.76 KG/M2 | OXYGEN SATURATION: 96 % | SYSTOLIC BLOOD PRESSURE: 129 MMHG | DIASTOLIC BLOOD PRESSURE: 59 MMHG | HEART RATE: 61 BPM | HEIGHT: 69 IN | WEIGHT: 173.9 LBS

## 2019-07-04 LAB
ANION GAP SERPL CALCULATED.3IONS-SCNC: 11 MMOL/L (ref 3–16)
BILIRUBIN URINE: NEGATIVE
BLOOD, URINE: NEGATIVE
BUN BLDV-MCNC: 13 MG/DL (ref 7–20)
CALCIUM SERPL-MCNC: 9 MG/DL (ref 8.3–10.6)
CHLORIDE BLD-SCNC: 112 MMOL/L (ref 99–110)
CLARITY: CLEAR
CO2: 22 MMOL/L (ref 21–32)
COLOR: YELLOW
CREAT SERPL-MCNC: 1 MG/DL (ref 0.8–1.3)
EKG ATRIAL RATE: 56 BPM
EKG ATRIAL RATE: 56 BPM
EKG DIAGNOSIS: NORMAL
EKG DIAGNOSIS: NORMAL
EKG P AXIS: 23 DEGREES
EKG P AXIS: 49 DEGREES
EKG P-R INTERVAL: 186 MS
EKG P-R INTERVAL: 194 MS
EKG Q-T INTERVAL: 456 MS
EKG Q-T INTERVAL: 484 MS
EKG QRS DURATION: 112 MS
EKG QRS DURATION: 154 MS
EKG QTC CALCULATION (BAZETT): 440 MS
EKG QTC CALCULATION (BAZETT): 467 MS
EKG R AXIS: 16 DEGREES
EKG R AXIS: 75 DEGREES
EKG T AXIS: 106 DEGREES
EKG T AXIS: 233 DEGREES
EKG VENTRICULAR RATE: 56 BPM
EKG VENTRICULAR RATE: 56 BPM
GFR AFRICAN AMERICAN: >60
GFR NON-AFRICAN AMERICAN: >60
GLUCOSE BLD-MCNC: 105 MG/DL (ref 70–99)
GLUCOSE BLD-MCNC: 157 MG/DL (ref 70–99)
GLUCOSE BLD-MCNC: 95 MG/DL (ref 70–99)
GLUCOSE BLD-MCNC: 97 MG/DL (ref 70–99)
GLUCOSE URINE: NEGATIVE MG/DL
INR BLD: 3.83 (ref 0.86–1.14)
KETONES, URINE: NEGATIVE MG/DL
LEUKOCYTE ESTERASE, URINE: NEGATIVE
MAGNESIUM: 1.8 MG/DL (ref 1.8–2.4)
MICROSCOPIC EXAMINATION: NORMAL
NITRITE, URINE: NEGATIVE
PERFORMED ON: ABNORMAL
PERFORMED ON: NORMAL
PERFORMED ON: NORMAL
PH UA: 6.5 (ref 5–8)
POTASSIUM REFLEX MAGNESIUM: 3.6 MMOL/L (ref 3.5–5.1)
PROTEIN UA: NEGATIVE MG/DL
PROTHROMBIN TIME: 43.7 SEC (ref 9.8–13)
SODIUM BLD-SCNC: 145 MMOL/L (ref 136–145)
SPECIFIC GRAVITY UA: 1.01 (ref 1–1.03)
TROPONIN: <0.01 NG/ML
TROPONIN: <0.01 NG/ML
URINE TYPE: NORMAL
UROBILINOGEN, URINE: 0.2 E.U./DL

## 2019-07-04 PROCEDURE — 2580000003 HC RX 258: Performed by: INTERNAL MEDICINE

## 2019-07-04 PROCEDURE — 80048 BASIC METABOLIC PNL TOTAL CA: CPT

## 2019-07-04 PROCEDURE — 99204 OFFICE O/P NEW MOD 45 MIN: CPT | Performed by: INTERNAL MEDICINE

## 2019-07-04 PROCEDURE — 81003 URINALYSIS AUTO W/O SCOPE: CPT

## 2019-07-04 PROCEDURE — 36415 COLL VENOUS BLD VENIPUNCTURE: CPT

## 2019-07-04 PROCEDURE — 84484 ASSAY OF TROPONIN QUANT: CPT

## 2019-07-04 PROCEDURE — 93010 ELECTROCARDIOGRAM REPORT: CPT | Performed by: INTERNAL MEDICINE

## 2019-07-04 PROCEDURE — 6370000000 HC RX 637 (ALT 250 FOR IP): Performed by: INTERNAL MEDICINE

## 2019-07-04 PROCEDURE — 83735 ASSAY OF MAGNESIUM: CPT

## 2019-07-04 PROCEDURE — 85610 PROTHROMBIN TIME: CPT

## 2019-07-04 PROCEDURE — G0378 HOSPITAL OBSERVATION PER HR: HCPCS

## 2019-07-04 RX ORDER — SODIUM CHLORIDE 9 MG/ML
INJECTION, SOLUTION INTRAVENOUS CONTINUOUS
Status: DISPENSED | OUTPATIENT
Start: 2019-07-04 | End: 2019-07-04

## 2019-07-04 RX ORDER — SODIUM CHLORIDE 0.9 % (FLUSH) 0.9 %
10 SYRINGE (ML) INJECTION PRN
Status: DISCONTINUED | OUTPATIENT
Start: 2019-07-04 | End: 2019-07-04 | Stop reason: HOSPADM

## 2019-07-04 RX ORDER — DEXTROSE MONOHYDRATE 50 MG/ML
100 INJECTION, SOLUTION INTRAVENOUS PRN
Status: DISCONTINUED | OUTPATIENT
Start: 2019-07-04 | End: 2019-07-04 | Stop reason: HOSPADM

## 2019-07-04 RX ORDER — PRAVASTATIN SODIUM 40 MG
40 TABLET ORAL NIGHTLY
Status: DISCONTINUED | OUTPATIENT
Start: 2019-07-04 | End: 2019-07-04 | Stop reason: HOSPADM

## 2019-07-04 RX ORDER — WARFARIN SODIUM 5 MG/1
5 TABLET ORAL ONCE
Status: DISCONTINUED | OUTPATIENT
Start: 2019-07-04 | End: 2019-07-04

## 2019-07-04 RX ORDER — NITROGLYCERIN 0.4 MG/1
0.4 TABLET SUBLINGUAL EVERY 5 MIN PRN
Status: DISCONTINUED | OUTPATIENT
Start: 2019-07-04 | End: 2019-07-04 | Stop reason: HOSPADM

## 2019-07-04 RX ORDER — LISINOPRIL 10 MG/1
10 TABLET ORAL DAILY
Status: DISCONTINUED | OUTPATIENT
Start: 2019-07-04 | End: 2019-07-04 | Stop reason: HOSPADM

## 2019-07-04 RX ORDER — SODIUM CHLORIDE 0.9 % (FLUSH) 0.9 %
10 SYRINGE (ML) INJECTION EVERY 12 HOURS SCHEDULED
Status: DISCONTINUED | OUTPATIENT
Start: 2019-07-04 | End: 2019-07-04 | Stop reason: HOSPADM

## 2019-07-04 RX ORDER — OMEGA-3-ACID ETHYL ESTERS 1 G/1
1200 CAPSULE, LIQUID FILLED ORAL DAILY
Status: DISCONTINUED | OUTPATIENT
Start: 2019-07-04 | End: 2019-07-04 | Stop reason: HOSPADM

## 2019-07-04 RX ORDER — ONDANSETRON 2 MG/ML
4 INJECTION INTRAMUSCULAR; INTRAVENOUS EVERY 6 HOURS PRN
Status: DISCONTINUED | OUTPATIENT
Start: 2019-07-04 | End: 2019-07-04 | Stop reason: HOSPADM

## 2019-07-04 RX ORDER — DEXTROSE MONOHYDRATE 25 G/50ML
12.5 INJECTION, SOLUTION INTRAVENOUS PRN
Status: DISCONTINUED | OUTPATIENT
Start: 2019-07-04 | End: 2019-07-04 | Stop reason: HOSPADM

## 2019-07-04 RX ORDER — FENOFIBRATE 160 MG/1
160 TABLET ORAL DAILY
Status: DISCONTINUED | OUTPATIENT
Start: 2019-07-04 | End: 2019-07-04 | Stop reason: HOSPADM

## 2019-07-04 RX ORDER — M-VIT,TX,IRON,MINS/CALC/FOLIC 27MG-0.4MG
1 TABLET ORAL DAILY
Status: DISCONTINUED | OUTPATIENT
Start: 2019-07-04 | End: 2019-07-04 | Stop reason: HOSPADM

## 2019-07-04 RX ORDER — WARFARIN SODIUM 2 MG/1
2 TABLET ORAL
Status: DISCONTINUED | OUTPATIENT
Start: 2019-07-04 | End: 2019-07-04 | Stop reason: HOSPADM

## 2019-07-04 RX ORDER — NICOTINE POLACRILEX 4 MG
15 LOZENGE BUCCAL PRN
Status: DISCONTINUED | OUTPATIENT
Start: 2019-07-04 | End: 2019-07-04 | Stop reason: HOSPADM

## 2019-07-04 RX ORDER — GLIMEPIRIDE 2 MG/1
1 TABLET ORAL
Status: DISCONTINUED | OUTPATIENT
Start: 2019-07-04 | End: 2019-07-04 | Stop reason: HOSPADM

## 2019-07-04 RX ADMIN — LISINOPRIL 10 MG: 10 TABLET ORAL at 08:21

## 2019-07-04 RX ADMIN — Medication 1 TABLET: at 08:20

## 2019-07-04 RX ADMIN — FENOFIBRATE 160 MG: 160 TABLET ORAL at 08:21

## 2019-07-04 RX ADMIN — SODIUM CHLORIDE: 9 INJECTION, SOLUTION INTRAVENOUS at 04:32

## 2019-07-04 RX ADMIN — GLIMEPIRIDE 1 MG: 2 TABLET ORAL at 08:21

## 2019-07-04 ASSESSMENT — PAIN SCALES - GENERAL
PAINLEVEL_OUTOF10: 0
PAINLEVEL_OUTOF10: 0

## 2019-07-04 NOTE — ED NOTES
Pt laying in bed resting comfortably. HR 38 radially. Pt put on bedside telemetry monitor. HR currently 70 on monitor.       Kwabena Whalen RN  07/03/19 2031

## 2019-07-04 NOTE — DISCHARGE SUMMARY
Hospital Medicine Discharge Summary    Patient ID: Shanon Villarreal      Patient's PCP: Alyson Woodall MD    Admit Date: 7/3/2019     Discharge Date:   07/04/19     Admitting Physician: Janis Almaguer MD     Discharge Physician: Nelida Waggoner MD     Discharge Diagnoses: Active Hospital Problems    Diagnosis    Dizziness [R42]    PAC (premature atrial contraction) [I49.1]    Nausea & vomiting [R11.2]    Sinus bradycardia [R00.1]    Bradycardia [R00.1]    Essential hypertension [I10]    Hx of CABG [Z95.1]    Diabetes mellitus ([address] [E11.9]    CAD (coronary artery disease) [I25.10]    Hyperlipidemia [E78.5]    H/O mechanical aortic valve replacement [Z95.2]       The patient was seen and examined on day of discharge and this discharge summary is in conjunction with any daily progress note from day of discharge. Hospital Course:     Admitted with fatigue  Vitals and labs remained stable. Was asymptomatic on the day following admission  Seen by cardiology. Scopolamine withdrawal was found as a very likely explanation. No further cardiac workup or treatment was recommended  Cardiology recommendation of ASA 81 mg po daily in addition to warfarin for mechanical cardiac valve was relayed to the patient, and was refused. Physical Exam Performed:     BP (!) 129/59   Pulse 61   Temp 97.4 °F (36.3 °C) (Oral)   Resp 16   Ht 5' 9\" (1.753 m)   Wt 173 lb 14.4 oz (78.9 kg)   SpO2 96%   BMI 25.68 kg/m²       General appearance:  No apparent distress, appears stated age and cooperative. HEENT:  Normal cephalic, atraumatic without obvious deformity. Pupils equal, round, and reactive to light. Extra ocular muscles intact. Conjunctivae/corneas clear. Neck: Supple, with full range of motion. No jugular venous distention. Trachea midline. Respiratory:  Normal respiratory effort. Clear to auscultation, bilaterally without Rales/Wheezes/Rhonchi.   Cardiovascular:  Regular rate and rhythm with normal S1/S2 without murmurs, rubs or gallops. Abdomen: Soft, non-tender, non-distended with normal bowel sounds. Musculoskeletal:  No clubbing, cyanosis or edema bilaterally. Full range of motion without deformity. Skin: Skin color, texture, turgor normal.  No rashes or lesions. Neurologic:  Neurovascularly intact without any focal sensory/motor deficits. Cranial nerves: II-XII intact, grossly non-focal.  Psychiatric:  Alert and oriented, thought content appropriate, normal insight  Capillary Refill: Brisk,< 3 seconds   Peripheral Pulses: +2 palpable, equal bilaterally       Labs: For convenience and continuity at follow-up the following most recent labs are provided:      CBC:    Lab Results   Component Value Date    WBC 9.3 07/03/2019    HGB 14.3 07/03/2019    HCT 41.8 07/03/2019     07/03/2019       Renal:    Lab Results   Component Value Date     07/04/2019    K 3.6 07/04/2019     07/04/2019    CO2 22 07/04/2019    BUN 13 07/04/2019    CREATININE 1.0 07/04/2019    CALCIUM 9.0 07/04/2019         Significant Diagnostic Studies    Radiology:   CT Head WO Contrast   Final Result   No acute intracranial abnormality. CTA HEAD NECK W CONTRAST   Final Result   1. There is a 30% stenosis in the proximal right internal carotid artery and   a 50% stenosis in the proximal left internal carotid artery. 2. Otherwise unremarkable CTA of the head and neck.                 Consults:     IP CONSULT TO HOSPITALIST  IP CONSULT TO PHARMACY  IP CONSULT TO CARDIOLOGY    Disposition:  Home     Condition at Discharge: Stable    Discharge Instructions/Follow-up:  Dr. Jose Moyer and PCP    Code Status:  Full Code     Activity: activity as tolerated    Diet: diabetic diet      Discharge Medications:     Current Discharge Medication List           Details   Omega-3 Fatty Acids (FISH OIL) 1200 MG CAPS Take 1,200 mg by mouth daily      metFORMIN (GLUCOPHAGE) 500 MG tablet TAKE 1 TABLET BY MOUTH 2 TIMES DAILY (WITH MEALS) DIABETIC MEDICINE. Qty: 180 tablet, Refills: 0    Associated Diagnoses: Type 2 diabetes mellitus without complication, without long-term current use of insulin ([facility])      fenofibrate 160 MG tablet TAKE 1 TABLET DAILY FOR HIGH CHOLESTEROL  Qty: 90 tablet, Refills: 3      lisinopril (PRINIVIL;ZESTRIL) 10 MG tablet TAKE 1 TABLET EVERY DAY FOR HYPERTENSION  Qty: 90 tablet, Refills: 3    Associated Diagnoses: Coronary artery disease involving native coronary artery of native heart without angina pectoris      pravastatin (PRAVACHOL) 80 MG tablet TAKE 1 TABLET EVERY EVENING FOR HIGH CHOLESTEROL  Qty: 90 tablet, Refills: 3      glimepiride (AMARYL) 1 MG tablet TAKE 1/2 TABLET AT BREAKFAST AND EVENING MEAL FOR DIABETES  Qty: 90 tablet, Refills: 3    Associated Diagnoses: Type 2 diabetes mellitus without complication, without long-term current use of insulin ([facility])      ACCU-CHEK BA PLUS strip TEST ONE TIME DAILY  Qty: 100 strip, Refills: 12      Lancets MISC 1 each by Does not apply route daily  Qty: 100 each, Refills: 3    Comments: Accu-Chek Multi Clicks Lancets  Associated Diagnoses: Type 2 diabetes mellitus without complication, without long-term current use of insulin ([facility])      warfarin (COUMADIN) 5 MG tablet Take 5 mg by mouth Daily. 3 days 7.5 and 4 days 5 mg  Qty: 135 tablet, Refills: 3      Psyllium (METAMUCIL PO) Take  by mouth.        multivitamin (THERAGRAN) per tablet Take 1 tablet by mouth daily. diclofenac sodium (VOLTAREN) 1 % GEL Apply 4 g topically 4 times daily  Qty: 5 Tube, Refills: 1    Associated Diagnoses: Left shoulder pain, unspecified chronicity; Capsulitis of left shoulder; Left arm pain; Tendinopathy of left rotator cuff; Shoulder impingement, left             Time Spent on discharge is more than 45 minutes in the examination, evaluation, counseling and review of medications and discharge plan.       Signed:    Kassie Sellers MD   7/4/2019      Thank you Megan Ramirez MD

## 2019-07-04 NOTE — H&P
Hospital Medicine History & Physical      PCP: Luiz Castillo MD    Date of Admission: 7/3/2019    Date of Service: Pt seen/examined on 7/3/2019 and Placed in Observation. Chief Complaint: Nausea, feeling queasy      History Of Present Illness:   68 y.o. male who presented to [facility] with above complaints  Patient presented to the ED today for the above complaints that has been going on since Tuesday, that is 2 days prior to presentation. He recently returned from an [address] [place] [address] Blvd, was using scopolamine patches on the ship to prevent motion sickness, he took off his last scopolamine patch on Monday. Tuesday after he got home, he went outside and mow his lawn on a riding more, then raked grass, and spent about 2 hours in the hot sun. Then he began to feel fatigued, nauseous, so he went back inside, took a shower and slept. His symptoms came back again today, so he decided to come to the ED. patient denies any chest pain, shortness of breath, palpitations or syncopal episode. He has a finger pulse ox meter that he uses at home and reports that his heart rate usually runs in the 70s or high 60s. In the ED patient heart rate was in the 50s seem to go low down to the 30s and 40s as well. Denies any abdominal pain, diarrhea or constipation. Patient has history of mechanical aortic valve for which she is on anticoagulation. Also has coronary artery disease status post CABG in the past.  Patient denies any orthopnea or leg swelling.     Past Medical History:          Diagnosis Date    Arthritis     Blood transfusion     CAD (coronary artery disease)     CAD (coronary artery disease) 02/06    CABG    Colon polyps     Diabetes mellitus (HCC)     GERD (gastroesophageal reflux disease)     GERD (gastroesophageal reflux disease)     GI bleed     H/O colonoscopy     H/O mechanical aortic valve replacement 02/06    Hemorrhoid     Hx of CABG     Hyperlipidemia     Hypertension     Kidney stone 7.5 and 4 days 5 mg 7/5/13  Yes Som A MD Maricruz   Psyllium (METAMUCIL PO) Take  by mouth. Yes Historical Provider, MD   multivitamin [facility]) per tablet Take 1 tablet by mouth daily. Yes Historical Provider, MD   diclofenac sodium (VOLTAREN) 1 % GEL Apply 4 g topically 4 times daily 1/22/18 2/21/18  Donald Dyer MD       Allergies:  Crestor [rosuvastatin]    Social History:      The patient currently lives at home    TOBACCO:   reports that he has never smoked. He has never used smokeless tobacco.  ETOH:   reports that he does not drink alcohol. Family History:  Positive as follows:        Problem Relation Age of Onset    Cancer Mother         skin    Psoriasis Mother     Cancer Father         throat, jaw    Cancer Sister         brain,wengers    Cancer Brother         skin    Diabetes Brother        REVIEW OF SYSTEMS:   Pertinent positives as noted in the HPI. All other systems reviewed and negative. PHYSICAL EXAM PERFORMED:    BP (!) 147/76   Pulse 56   Temp 98.5 °F (36.9 °C) (Oral)   Resp 16   Ht 5' 9\" (1.753 m)   Wt 173 lb 14.4 oz (78.9 kg)   SpO2 97%   BMI 25.68 kg/m²     General appearance:  No apparent distress, appears stated age and cooperative. HEENT:  Normal cephalic, atraumatic without obvious deformity. Pupils equal, round, and reactive to light. Extra ocular muscles intact. Conjunctivae/corneas clear. Neck: Supple, with full range of motion. No jugular venous distention. Trachea midline. Respiratory:  Normal respiratory effort. Clear to auscultation, bilaterally without Rales/Wheezes/Rhonchi. Cardiovascular: Bradycardic, regular  rhythm with normal S1/S2 without murmurs, rubs or gallops. Abdomen: Soft, non-tender, non-distended with normal bowel sounds. Musculoskeletal:  No clubbing, cyanosis or edema bilaterally. Full range of motion without deformity. Skin: Skin color, texture, turgor normal.  No rashes or lesions.   Neurologic:  Neurovascularly intact without continue to follow-up with Dr. Anastacio Mccann    Hx of LBBB  - noted    Hypertension  -Controlled, resume home medication regimen    Hyperlipidemia -resume statin    DM2 -controlled, last A1c 7.8  Resume home medications, added sliding scale insulin with Accu-Cheks    H/O mechanical aortic valve replacement on Coumadin  -Coumadin dosing per pharmacy  -Per last echo December 2017, valve function was normal        DVT Prophylaxis: On Coumadin  Diet: DIET CARB CONTROL;  Code Status: Full Code    PT/OT Eval Status: Not indicated    Dispo -observation stay 1-2 midnights       Ciara Aguirre MD    Thank you Liza Guevara MD for the opportunity to be involved in this patient's care. If you have any questions or concerns please feel free to contact me at [phone].

## 2019-07-04 NOTE — CONSULTS
reviewed and are listed in nursing record and/or below  Prior to Admission medications    Medication Sig Start Date End Date Taking? Authorizing Provider   Omega-3 Fatty Acids (FISH OIL) 1200 MG CAPS Take 1,200 mg by mouth daily   Yes Historical Provider, MD   metFORMIN (GLUCOPHAGE) 500 MG tablet TAKE 1 TABLET BY MOUTH 2 TIMES DAILY (WITH MEALS) DIABETIC MEDICINE. 6/3/19  Yes Aubree Baumann MD   scopolamine (TRANSDERM-SCOP, 1.5 MG,) transdermal patch Place 1 patch onto the skin every 72 hours 5/31/19  Yes NELY Greco CNP   fenofibrate 160 MG tablet TAKE 1 TABLET DAILY FOR HIGH CHOLESTEROL 3/23/19  Yes Jhonny Alcantara MD   lisinopril (PRINIVIL;ZESTRIL) 10 MG tablet TAKE 1 TABLET EVERY DAY FOR HYPERTENSION 3/8/19  Yes NELY Dick CNP   pravastatin (PRAVACHOL) 80 MG tablet TAKE 1 TABLET EVERY EVENING FOR HIGH CHOLESTEROL 3/8/19  Yes NELY Dick CNP   glimepiride (AMARYL) 1 MG tablet TAKE 1/2 TABLET AT BREAKFAST AND EVENING MEAL FOR DIABETES 12/30/18  Yes Ji Alcantara MD   ACCU-CHEK BA PLUS strip TEST ONE TIME DAILY 4/23/18  Yes Ji Alcantara MD   Lancets MISC 1 each by Does not apply route daily 9/14/17  Yes Ji Alcantara MD   warfarin (COUMADIN) 5 MG tablet Take 5 mg by mouth Daily. 3 days 7.5 and 4 days 5 mg 7/5/13  Yes Som Alcantara MD   Psyllium (METAMUCIL PO) Take  by mouth. Yes Historical Provider, MD   multivitamin [facility]) per tablet Take 1 tablet by mouth daily.      Yes Historical Provider, MD   diclofenac sodium (VOLTAREN) 1 % GEL Apply 4 g topically 4 times daily 1/22/18 2/21/18  Tigist Fall MD        CURRENT Medications:    glucose (GLUTOSE) 40 % oral gel 15 g PRN   dextrose 50 % IV solution PRN   glucagon (rDNA) injection 1 mg PRN   dextrose 5 % solution PRN   insulin lispro (HUMALOG) injection vial 0-6 Units TID WC   insulin lispro (HUMALOG) injection vial 0-3 Units Nightly   fenofibrate tablet 160 mg Daily   glimepiride (AMARYL) tablet 1 mg Daily with breakfast lesions   Pysch: Normal mood and affect   Neurologic: Normal gross motor and sensory exam.         Labs   CBC: Lab Results   Component Value Date    WBC 9.3 2019    RBC 4.73 2019    HGB 14.3 2019    HCT 41.8 2019    MCV 88.4 2019    RDW 13.9 2019     2019     CMP:  Lab Results   Component Value Date     2019    K 3.6 2019     2019    CO2 22 2019    BUN 13 2019    CREATININE 1.0 2019    GFRAA >60 2019    GFRAA >60 2011    AGRATIO 1.7 2019    LABGLOM >60 2019    GLUCOSE 105 2019    PROT 6.8 2019    PROT 6.5 2011    CALCIUM 9.0 2019    BILITOT 0.4 2019    ALKPHOS 45 2019    AST 13 2019    ALT 13 2019     PT/INR:  No results found for: PTINR  HgBA1c:  Lab Results   Component Value Date    LABA1C 7.8 (H) 01/10/2019     Lab Results   Component Value Date    CKTOTAL 42 2019    TROPONINI <0.01 2019         Cardiac Data     Last EK/3/2019 sinus leticia at 56, LBBB    Echo: 2017   Left ventricle: The cavity size was normal. There was mild    concentric hypertrophy. Systolic function was normal. The    calculated ejection fraction was in the range of 52% to 57%. Wall    motion was normal; there were no regional wall motion    abnormalities. Doppler parameters are consistent with abnormal    left ventricular relaxation (grade 1 diastolic dysfunction). - Aortic valve: An On-X mechanical prosthesis was present and    functioning normally. - Systemic veins: Not visualized    Cath:    Studies:   CTA head and neck  1. There is a 30% stenosis in the proximal right internal carotid artery and   a 50% stenosis in the proximal left internal carotid artery. 2. Otherwise unremarkable CTA of the head and neck. HCT: negative    Assessment and Plan      1. Sinus Bradycardia: unchanged since 2011 ECG  2. Dizziness  3.  CAD   - CABG    - .

## 2019-07-04 NOTE — CONSULTS
Pharmacy to Dose Warfarin     Dx: MVR  Goal INR range 2.5-3.5  Home Warfarin dose: 7.5 mg three days a week and 5 mg 4 days a week.     Date                 INR                  Warfarin  7/3                   3.44                  5 mg  7/4  3.83   2mg     Recommend low dose Warfarin 2mg tonight x1 vs holding therapy. Daily INR ordered. Rx will continue to manage therapy per consult order. Marley Belle PharmD, BCPS   7/4/2019 8:03 AM

## 2019-07-05 ENCOUNTER — TELEPHONE (OUTPATIENT)
Dept: INTERNAL MEDICINE CLINIC | Age: 69
End: 2019-07-05

## 2019-07-15 ENCOUNTER — OFFICE VISIT (OUTPATIENT)
Dept: INTERNAL MEDICINE CLINIC | Age: 69
End: 2019-07-15
Payer: MEDICARE

## 2019-07-15 VITALS
HEART RATE: 65 BPM | WEIGHT: 175.2 LBS | HEIGHT: 69 IN | OXYGEN SATURATION: 97 % | SYSTOLIC BLOOD PRESSURE: 100 MMHG | BODY MASS INDEX: 25.95 KG/M2 | RESPIRATION RATE: 16 BRPM | DIASTOLIC BLOOD PRESSURE: 60 MMHG

## 2019-07-15 DIAGNOSIS — Z11.59 SCREENING FOR VIRAL DISEASE: ICD-10-CM

## 2019-07-15 DIAGNOSIS — Z12.5 SCREENING FOR PROSTATE CANCER: ICD-10-CM

## 2019-07-15 DIAGNOSIS — E78.5 HYPERLIPIDEMIA, UNSPECIFIED HYPERLIPIDEMIA TYPE: Primary | ICD-10-CM

## 2019-07-15 DIAGNOSIS — E11.9 TYPE 2 DIABETES MELLITUS WITHOUT COMPLICATION, WITHOUT LONG-TERM CURRENT USE OF INSULIN (HCC): ICD-10-CM

## 2019-07-15 PROCEDURE — 1123F ACP DISCUSS/DSCN MKR DOCD: CPT | Performed by: INTERNAL MEDICINE

## 2019-07-15 PROCEDURE — 1036F TOBACCO NON-USER: CPT | Performed by: INTERNAL MEDICINE

## 2019-07-15 PROCEDURE — 3017F COLORECTAL CA SCREEN DOC REV: CPT | Performed by: INTERNAL MEDICINE

## 2019-07-15 PROCEDURE — G8598 ASA/ANTIPLAT THER USED: HCPCS | Performed by: INTERNAL MEDICINE

## 2019-07-15 PROCEDURE — G8427 DOCREV CUR MEDS BY ELIG CLIN: HCPCS | Performed by: INTERNAL MEDICINE

## 2019-07-15 PROCEDURE — 4040F PNEUMOC VAC/ADMIN/RCVD: CPT | Performed by: INTERNAL MEDICINE

## 2019-07-15 PROCEDURE — G8417 CALC BMI ABV UP PARAM F/U: HCPCS | Performed by: INTERNAL MEDICINE

## 2019-07-15 PROCEDURE — 99213 OFFICE O/P EST LOW 20 MIN: CPT | Performed by: INTERNAL MEDICINE

## 2019-07-15 PROCEDURE — 2022F DILAT RTA XM EVC RTNOPTHY: CPT | Performed by: INTERNAL MEDICINE

## 2019-07-15 PROCEDURE — 3045F PR MOST RECENT HEMOGLOBIN A1C LEVEL 7.0-9.0%: CPT | Performed by: INTERNAL MEDICINE

## 2019-07-15 ASSESSMENT — ENCOUNTER SYMPTOMS
DIARRHEA: 0
CHEST TIGHTNESS: 0
NAUSEA: 0
SHORTNESS OF BREATH: 0
ABDOMINAL DISTENTION: 0
COUGH: 0
CONSTIPATION: 0
BACK PAIN: 0
WHEEZING: 0
VOMITING: 0

## 2019-07-15 ASSESSMENT — PATIENT HEALTH QUESTIONNAIRE - PHQ9
SUM OF ALL RESPONSES TO PHQ9 QUESTIONS 1 & 2: 0
SUM OF ALL RESPONSES TO PHQ QUESTIONS 1-9: 0
2. FEELING DOWN, DEPRESSED OR HOPELESS: 0
SUM OF ALL RESPONSES TO PHQ QUESTIONS 1-9: 0
1. LITTLE INTEREST OR PLEASURE IN DOING THINGS: 0

## 2019-07-30 ENCOUNTER — TELEPHONE (OUTPATIENT)
Dept: INTERNAL MEDICINE CLINIC | Age: 69
End: 2019-07-30

## 2019-07-31 RX ORDER — BLOOD SUGAR DIAGNOSTIC
STRIP MISCELLANEOUS
Qty: 100 STRIP | Refills: 12 | Status: SHIPPED | OUTPATIENT
Start: 2019-07-31 | End: 2019-09-23 | Stop reason: SDUPTHER

## 2019-09-04 LAB — DIABETIC RETINOPATHY: NEGATIVE

## 2019-09-09 DIAGNOSIS — E11.9 TYPE 2 DIABETES MELLITUS WITHOUT COMPLICATION, WITHOUT LONG-TERM CURRENT USE OF INSULIN (HCC): ICD-10-CM

## 2019-09-09 RX ORDER — LANCETS 30 GAUGE
1 EACH MISCELLANEOUS DAILY
Qty: 100 EACH | Refills: 3 | Status: SHIPPED | OUTPATIENT
Start: 2019-09-09

## 2019-09-12 ENCOUNTER — TELEPHONE (OUTPATIENT)
Dept: INTERNAL MEDICINE CLINIC | Age: 69
End: 2019-09-12

## 2019-09-12 DIAGNOSIS — E11.9 TYPE 2 DIABETES MELLITUS WITHOUT COMPLICATION, WITHOUT LONG-TERM CURRENT USE OF INSULIN (HCC): ICD-10-CM

## 2019-09-12 NOTE — TELEPHONE ENCOUNTER
Refill request for metformin medication.      [facility]    Last visit - 7/15/19     Pending visit - 1/16/2020    Last refill -6/3/19  0 refills

## 2019-09-12 NOTE — TELEPHONE ENCOUNTER
States that Mora Erazo is sending a request for us to have to redo his lancet orders due to they have been discontinued. Made him aware we will be looking for it to come and follow up and let him know once we receive the form.

## 2019-09-20 DIAGNOSIS — E11.9 TYPE 2 DIABETES MELLITUS WITHOUT COMPLICATION, WITHOUT LONG-TERM CURRENT USE OF INSULIN (HCC): Primary | ICD-10-CM

## 2019-12-11 DIAGNOSIS — E11.9 TYPE 2 DIABETES MELLITUS WITHOUT COMPLICATION, WITHOUT LONG-TERM CURRENT USE OF INSULIN (HCC): ICD-10-CM

## 2019-12-11 RX ORDER — GLIMEPIRIDE 1 MG/1
TABLET ORAL
Qty: 90 TABLET | Refills: 0 | Status: SHIPPED | OUTPATIENT
Start: 2019-12-11 | End: 2020-02-13

## 2019-12-19 ENCOUNTER — TELEPHONE (OUTPATIENT)
Dept: INTERNAL MEDICINE CLINIC | Age: 69
End: 2019-12-19

## 2019-12-20 ENCOUNTER — TELEPHONE (OUTPATIENT)
Dept: INTERNAL MEDICINE CLINIC | Age: 69
End: 2019-12-20

## 2019-12-30 NOTE — TELEPHONE ENCOUNTER
Refill request for metformin medication.      Name of [address] [place] [address]    Last visit - 7-15-19     Pending visit - 1-16-20    Last refill -9-12-19    Medication Contract signed -   Last Savanna Mcintyre ran-     Additional Comments

## 2020-01-16 ENCOUNTER — OFFICE VISIT (OUTPATIENT)
Dept: INTERNAL MEDICINE CLINIC | Age: 70
End: 2020-01-16
Payer: MEDICARE

## 2020-01-16 VITALS
RESPIRATION RATE: 16 BRPM | TEMPERATURE: 98 F | BODY MASS INDEX: 26.58 KG/M2 | HEART RATE: 69 BPM | DIASTOLIC BLOOD PRESSURE: 78 MMHG | SYSTOLIC BLOOD PRESSURE: 132 MMHG | WEIGHT: 180 LBS | OXYGEN SATURATION: 95 %

## 2020-01-16 PROBLEM — I49.3 PVC'S (PREMATURE VENTRICULAR CONTRACTIONS): Status: ACTIVE | Noted: 2020-01-16

## 2020-01-16 PROCEDURE — 2022F DILAT RTA XM EVC RTNOPTHY: CPT | Performed by: INTERNAL MEDICINE

## 2020-01-16 PROCEDURE — G8484 FLU IMMUNIZE NO ADMIN: HCPCS | Performed by: INTERNAL MEDICINE

## 2020-01-16 PROCEDURE — 4040F PNEUMOC VAC/ADMIN/RCVD: CPT | Performed by: INTERNAL MEDICINE

## 2020-01-16 PROCEDURE — 3017F COLORECTAL CA SCREEN DOC REV: CPT | Performed by: INTERNAL MEDICINE

## 2020-01-16 PROCEDURE — 99215 OFFICE O/P EST HI 40 MIN: CPT | Performed by: INTERNAL MEDICINE

## 2020-01-16 PROCEDURE — 1123F ACP DISCUSS/DSCN MKR DOCD: CPT | Performed by: INTERNAL MEDICINE

## 2020-01-16 PROCEDURE — G8427 DOCREV CUR MEDS BY ELIG CLIN: HCPCS | Performed by: INTERNAL MEDICINE

## 2020-01-16 PROCEDURE — G8417 CALC BMI ABV UP PARAM F/U: HCPCS | Performed by: INTERNAL MEDICINE

## 2020-01-16 PROCEDURE — 1036F TOBACCO NON-USER: CPT | Performed by: INTERNAL MEDICINE

## 2020-01-16 ASSESSMENT — PATIENT HEALTH QUESTIONNAIRE - PHQ9
1. LITTLE INTEREST OR PLEASURE IN DOING THINGS: 0
SUM OF ALL RESPONSES TO PHQ9 QUESTIONS 1 & 2: 0
SUM OF ALL RESPONSES TO PHQ QUESTIONS 1-9: 0
2. FEELING DOWN, DEPRESSED OR HOPELESS: 0
SUM OF ALL RESPONSES TO PHQ QUESTIONS 1-9: 0

## 2020-01-16 NOTE — PROGRESS NOTES
SUBJECTIVE:  Virgil brush 68 y.o.male    Chief Complaint   Patient presents with    Other     Shortness of Breath, since around October.  Diabetes       HPI     patient notes sense of dyspnea since about October 2019. This noted when he was out deer hunting, walking laundry up steps, or result in the yard during cold weather. He denies associated chest pain. No nocturnal dyspnea. He states this is rather sudden change from earlier when he was on vacation in [place] but did much walking. Patient was seen in the office by Dr. Bills Spine his cardiologist 12/9/2019: EKG demonstrated  left bundle branch block. Patient was treated 12/9/2019 with Lasix 20 mg once a day. This was stopped 12/20/2019. Reviewed laboratory data 12/9/2019: BNP: 27. BUN: 17.  Creatinine: 1.37. CBC normal.  Reviewed laboratory data 12/18/2018: BUN: 23.  Creatinine: 1.48. Glucose: 182. Reviewed laboratory data 1/6/2020: BUN: 19 creatinine: 1.37. Triglycerides: 169. Hemoglobin A1c 7.6. PSA  2.6. Hepatitis C testing negative. Reviewed: 12/17/2018: Luther Hodgkin: Normal left ventricular perfusion defect due to artifact. Mildly decreased left ventricular systolic function. Reviewed: 7/30/2019: Echocardiogram: Normal diastolic function. Left ventricular systolic function normal.  Ejection fraction: 52-57%. Aortic valve normal functioning. Mild increase central venous pressure. Social history: Tobacco none. Asthma: No history. Patient notes occasional dry cough of note he is on lisinopril. Occasional sense of choking. 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 Reviewed: Hospital admission 7/3/2019 with discharge 7/4/2019. Patient had just returned from a vacation cruise . And had been using scopolamine patches on a regular basis. He was admitted with fatigue, dizziness, PACs, sinus bradycardia. Seen by cardiology. Ackley Scopolamine withdrawal may have been his problem.   7/8/2019: and atraumatic. Right Ear: Tympanic membrane, external ear and ear canal normal.   Left Ear: Tympanic membrane, external ear and ear canal normal.   Nose: Nose normal.   Mouth/Throat: Oropharynx is clear and moist and mucous membranes are normal. No oropharyngeal exudate or posterior oropharyngeal erythema. He has no cervical adenopathy. Eyes: Conjunctivae and extraocular motions are normal. Pupils are equal, round, and reactive to light. Neck: Full passive range of motion without pain. Neck supple. No JVD present. Carotids radiation of heart murmur? Bilateral?. No mass and no thyromegaly present. Cardiovascular: Normal rate, regular rhythm, normal heart sounds and intact distal pulses. Exam reveals no gallop and no friction rub. JAZZY with radiation into carotids bilaterally murmur heard. Pulmonary/Chest: Effort normal and breath sounds normal. No respiratory distress. He has no wheezes, rhonchi or rales. Abdominal: Soft, non-tender. Bowel sounds and aorta are normal. There is no organomegaly, mass or bruit. Genitourinary: Not examined. Musculoskeletal: Normal range of motion, no synovitis. He exhibits no edema. Neurological: He is alert and oriented to person, place, and time. He has normal reflexes. No cranial nerve deficit. Coordination normal.   Skin: Skin is warm, dry and intact. No suspicious lesions are noted. Psychiatric: He has a normal mood and affect. His speech is normal and behavior is normal. Judgment, cognition and memory are normal. Blood pressure 132/78, pulse 69, temperature 98 °F (36.7 °C), temperature source Oral, resp. rate 16, weight 180 lb (81.6 kg), SpO2 95 %. ASSESSMENT/PLAN:    1. SOB (shortness of breath)      Cardiac work-up as above. Mild increase in central venous pressure. Mechanical aortic valve appears to be functioning properly. Will evaluate further with PFT and chest x-ray. May also consider arrhythmia problem?   Call me next day for results  - Full PFT Study With Bronchodilator  - XR CHEST STANDARD (2 VW); Future    2. Type 2 diabetes mellitus without complication, without long-term current use of insulin ([facility])    Discussed low sugar low-carb diet and the need to control diabetes    3. Hyperlipidemia, unspecified hyperlipidemia type  Borderline elevated triglycerides     4. Coronary artery disease involving native coronary artery of native heart without angina pectoris  No complaint of chest pain at this time     5. Stage 3 chronic kidney disease ([address]  He is now off Lasix notable for close to his baseline creatinine level  - Basic Metabolic Panel; Future    6. H/O mechanical aortic valve replacement  Echocardiogram demonstrates what appears to be normal functioning aortic valve replacement    7. Holter monitor:  PACs and PVCs 7/2019    I spent more than 40 minutes with this patient face-to-face of which greater than 50% involved counseling and care coordination as mentioned above    Return to follow-up  Dr. Aleena Dunbar    Return in about 4 weeks (around 2/13/2020) for SOB.

## 2020-01-21 ENCOUNTER — HOSPITAL ENCOUNTER (OUTPATIENT)
Dept: GENERAL RADIOLOGY | Age: 70
Discharge: HOME | End: 2020-01-21
Payer: MEDICARE

## 2020-01-21 ENCOUNTER — HOSPITAL ENCOUNTER (OUTPATIENT)
Age: 70
Discharge: HOME | End: 2020-01-21
Payer: MEDICARE

## 2020-01-21 PROCEDURE — 71046 X-RAY EXAM CHEST 2 VIEWS: CPT

## 2020-01-24 ENCOUNTER — HOSPITAL ENCOUNTER (OUTPATIENT)
Dept: PULMONOLOGY | Age: 70
Discharge: HOME | End: 2020-01-24
Payer: MEDICARE

## 2020-01-24 LAB
DLCO %PRED: 83 %
DLCO PRED: NORMAL
DLCO/VA %PRED: NORMAL
DLCO/VA PRED: NORMAL
DLCO/VA: NORMAL
DLCO: NORMAL
DLCO: NORMAL
EXPIRATORY TIME-POST: NORMAL
EXPIRATORY TIME: NORMAL
FEF 25-75% %CHNG: NORMAL
FEF 25-75% %PRED-POST: NORMAL
FEF 25-75% %PRED-PRE: NORMAL
FEF 25-75% PRED: NORMAL
FEF 25-75%-POST: NORMAL
FEF 25-75%-PRE: NORMAL
FEV1 %PRED-POST: 103 %
FEV1 %PRED-PRE: 100 %
FEV1 PRED: NORMAL
FEV1-POST: NORMAL
FEV1-PRE: NORMAL
FEV1/FVC %PRED-POST: NORMAL
FEV1/FVC %PRED-PRE: NORMAL
FEV1/FVC PRED: NORMAL
FEV1/FVC-POST: 86 %
FEV1/FVC-PRE: 90 %
FEV1/FVC: NORMAL
FEV1: NORMAL
FVC %PRED-POST: NORMAL
FVC %PRED-PRE: NORMAL
FVC PRED: NORMAL
FVC-POST: NORMAL
FVC-PRE: NORMAL
FVC: NORMAL
GAW %PRED: NORMAL
GAW PRED: NORMAL
GAW: NORMAL
IC %PRED: NORMAL
IC PRED: NORMAL
IC: NORMAL
MEP: NORMAL
MIP: NORMAL
MVV %PRED-PRE: NORMAL
MVV PRED: NORMAL
MVV-PRE: NORMAL
PEF %PRED-POST: NORMAL
PEF %PRED-PRE: NORMAL
PEF PRED: NORMAL
PEF%CHNG: NORMAL
PEF-POST: NORMAL
PEF-PRE: NORMAL
RAW %PRED: NORMAL
RAW PRED: NORMAL
RAW: NORMAL
RV %PRED: NORMAL
RV PRED: NORMAL
RV: NORMAL
SVC %PRED: NORMAL
SVC PRED: NORMAL
SVC: NORMAL
TLC %PRED: 118 %
TLC PRED: NORMAL
TLC: NORMAL
TLC: NORMAL
VA %PRED: NORMAL
VA PRED: NORMAL
VA: NORMAL
VTG %PRED: NORMAL
VTG PRED: NORMAL
VTG: NORMAL

## 2020-01-24 PROCEDURE — 6370000000 HC RX 637 (ALT 250 FOR IP): Performed by: INTERNAL MEDICINE

## 2020-01-24 PROCEDURE — 94729 DIFFUSING CAPACITY: CPT

## 2020-01-24 PROCEDURE — 94726 PLETHYSMOGRAPHY LUNG VOLUMES: CPT

## 2020-01-24 PROCEDURE — 94760 N-INVAS EAR/PLS OXIMETRY 1: CPT

## 2020-01-24 PROCEDURE — 94060 EVALUATION OF WHEEZING: CPT

## 2020-01-24 RX ORDER — ALBUTEROL SULFATE 90 UG/1
4 AEROSOL, METERED RESPIRATORY (INHALATION) ONCE
Status: COMPLETED | OUTPATIENT
Start: 2020-01-24 | End: 2020-01-24

## 2020-01-24 RX ADMIN — Medication 4 PUFF: at 12:39

## 2020-01-24 ASSESSMENT — PULMONARY FUNCTION TESTS
FEV1_PERCENT_PREDICTED_POST: 103
FEV1/FVC_POST: 86
FEV1/FVC_PRE: 90
FEV1_PERCENT_PREDICTED_PRE: 100

## 2020-01-25 NOTE — PROCEDURES
[address] [facility]                               PULMONARY FUNCTION    PATIENT NAME: Stephen Baptiste                    :        1950  MED REC NO:   [id number]                          ROOM:  ACCOUNT NO:   [de-identified]                           ADMIT DATE: 2020  PROVIDER:     Lena Mallory MD    DATE OF PROCEDURE:  2020    PFT    Spirometry showed FVC 3.46 L, 82% predicted, FEV1 3.1 L, 100% predicted,  with FEV1/FVC ratio of 90%. There was no response to bronchodilator. Lung volumes showed air trapping, diffusion capacity was normal.    IMPRESSION:  Except for air trapping, this is a normal PFT. Air  trapping may indicate a subclinical obstructive process such as asthma. Clinical correlation is recommended.         Aron Maxwell MD    D: 2020 01:76:32       T: 2020 2:18:24     [id number]  Job#: [id number]     Doc#: [id number]    CC:  Nathaniel Vaughn MD

## 2020-02-03 ENCOUNTER — TELEPHONE (OUTPATIENT)
Dept: INTERNAL MEDICINE CLINIC | Age: 70
End: 2020-02-03

## 2020-02-12 ENCOUNTER — OFFICE VISIT (OUTPATIENT)
Dept: PULMONOLOGY | Age: 70
End: 2020-02-12
Payer: MEDICARE

## 2020-02-12 VITALS
HEIGHT: 69 IN | WEIGHT: 180 LBS | SYSTOLIC BLOOD PRESSURE: 129 MMHG | RESPIRATION RATE: 18 BRPM | TEMPERATURE: 98 F | OXYGEN SATURATION: 97 % | DIASTOLIC BLOOD PRESSURE: 63 MMHG | HEART RATE: 67 BPM | BODY MASS INDEX: 26.66 KG/M2

## 2020-02-12 PROCEDURE — G8427 DOCREV CUR MEDS BY ELIG CLIN: HCPCS | Performed by: INTERNAL MEDICINE

## 2020-02-12 PROCEDURE — G8484 FLU IMMUNIZE NO ADMIN: HCPCS | Performed by: INTERNAL MEDICINE

## 2020-02-12 PROCEDURE — 4040F PNEUMOC VAC/ADMIN/RCVD: CPT | Performed by: INTERNAL MEDICINE

## 2020-02-12 PROCEDURE — G8417 CALC BMI ABV UP PARAM F/U: HCPCS | Performed by: INTERNAL MEDICINE

## 2020-02-12 PROCEDURE — 99204 OFFICE O/P NEW MOD 45 MIN: CPT | Performed by: INTERNAL MEDICINE

## 2020-02-12 PROCEDURE — 3017F COLORECTAL CA SCREEN DOC REV: CPT | Performed by: INTERNAL MEDICINE

## 2020-02-12 PROCEDURE — 1036F TOBACCO NON-USER: CPT | Performed by: INTERNAL MEDICINE

## 2020-02-12 PROCEDURE — 1123F ACP DISCUSS/DSCN MKR DOCD: CPT | Performed by: INTERNAL MEDICINE

## 2020-02-12 ASSESSMENT — SLEEP AND FATIGUE QUESTIONNAIRES
HOW LIKELY ARE YOU TO NOD OFF OR FALL ASLEEP WHILE SITTING INACTIVE IN A PUBLIC PLACE: 0
HOW LIKELY ARE YOU TO NOD OFF OR FALL ASLEEP WHILE SITTING QUIETLY AFTER LUNCH WITHOUT ALCOHOL: 0
HOW LIKELY ARE YOU TO NOD OFF OR FALL ASLEEP WHILE WATCHING TV: 2
HOW LIKELY ARE YOU TO NOD OFF OR FALL ASLEEP WHILE SITTING AND READING: 0
HOW LIKELY ARE YOU TO NOD OFF OR FALL ASLEEP WHEN YOU ARE A PASSENGER IN A CAR FOR AN HOUR WITHOUT A BREAK: 0
HOW LIKELY ARE YOU TO NOD OFF OR FALL ASLEEP WHILE SITTING AND TALKING TO SOMEONE: 0
NECK CIRCUMFERENCE (INCHES): 15
HOW LIKELY ARE YOU TO NOD OFF OR FALL ASLEEP IN A CAR, WHILE STOPPED FOR A FEW MINUTES IN TRAFFIC: 0
HOW LIKELY ARE YOU TO NOD OFF OR FALL ASLEEP WHILE LYING DOWN TO REST IN THE AFTERNOON WHEN CIRCUMSTANCES PERMIT: 3
ESS TOTAL SCORE: 5

## 2020-02-12 NOTE — LETTER
[facility]  [address] [facility] [address]  Phone: [phone]  Fax: [phone]    Angel Atkins MD        February 13, 2020       Patient: Ean Plaza   MR Number: <[id number]>   YOB: 1950   Date of Visit: 2/12/2020       Dear Dr. Sabina Castrejon: Thank you for the request for consultation for Obclara De Jesusllor to me for the evaluation of shortness of breath. Below are the relevant portions of my assessment and plan of care. If you have questions, please do not hesitate to call me. I look forward to following Leticia Bustillo along with you.     Sincerely,        Jacob Aparicio MD    CC providers:  Sabine Alcantara MD  [address] [place] [address]

## 2020-02-12 NOTE — PROGRESS NOTES
Chief Complaint/Referring Provider:  Patient is being seen at the request of Dr. Georganne Schwab for a consultation for SOB. Presenting HPI: 69 yo male with PMH significant for CABG x3 (2006), Mechanical aortic valve replacement on coumadin (2006), DM2, GERD, HLD, and HTN who presents to the office today with SOB for 4 months. October he was hunting with his brother and noticed that he was feeling SOB after walking for 1/4 mile. He also noted that he had to stop 4 times going up a small hill to catch his breath. He was unable to keep up with his brother. Since then the SOB with exertion has gotten worse. In the summer he was walking on the treadmill for 15- 20 minutes at a rate of 3 mph with an incline of 8. Now he can only walk for about 10 minutes at 2.6 mph with an incline of 5, and sometimes he has to decrease the mph and incline even more. In the last month he has been getting SOB taking the garbage to the road or getting the mail, states his driveway is \"normal length with a small incline. \"  He does admit to an intermittent dry cough. Denies fever, chills, allergy symptoms. Last cold was in October, and seemed to resolve. Did get a flu shot this year. Has acid reflux which improves with tums or peptobismol, although notes it has been worse the last 2 weeks. He is unsure if it is related but has had some difficulty swallowing solids over the last couple of years, this has not recently worsened. He has lost weight over the last few years, but was intentionally trying to watch what he was eating and increase his exercise. States 5 years ago he weighed 202 lbs, current weight is 180 lbs. Follows with cardiology, Dr. Dominga Hull. In December he had a chemical stress test which he states was normal.  Dr. Dominga Hull did not give him any exercise restrictions. Pt never a smoker, used to drink alcohol on occasion when he was in the [employer]. Never drug user. Was stationed in [place] when he was in [employer].   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 Worked with polycarbonates which were made at a different facility. No known exposures to mold, asbestos, or other environmental hazards. Babysits his daughter's dog sometimes, has done this for years and has never seemed to bother him. May have slight allergy to pollen but has never needed any medication for this. His only recent travel was to [place] in June 2019. At that time he was using a \"seasick patch\" behind his ear (which he had used without any problem in 2018). Notes that when he removed it after the last day of flying home he had severe nausea and some vomiting. He went to the ER at that time and was given reglan which helped the N/V. He was noted to have bradycardia down to 29 bpm, so he was observed in the hospital.  He did not have any other bradycardic episodes while in the hospital or while wearing a monitor for a couple of days. As mentioned above he has been following with cardiology, and last saw Dr. Brown Pallas in December 2019. Past Medical History:   Diagnosis Date    Arthritis     Blood transfusion     CAD (coronary artery disease)     CAD (coronary artery disease) 02/06    CABG    Colon polyps     Diabetes mellitus (HCC)     GERD (gastroesophageal reflux disease)     GERD (gastroesophageal reflux disease)     GI bleed     H/O colonoscopy     H/O mechanical aortic valve replacement 02/06    Hemorrhoid     Hx of CABG     Hyperlipidemia     Hypertension     Kidney stone     Kidney stones 2006    Kidney stones     Osteoarthritis     Plantar fasciitis     PVC's (premature ventricular contractions) 1/16/2020    Holter monitor: [facility] cardiology: 7/8/2019 minimal PACs.   Frequent PVCs and 2 pairs    Type II or unspecified type diabetes mellitus without mention of complication, not stated as uncontrolled        Past Surgical History:   Procedure Laterality Date    CARDIAC SURGERY valve replacement and bypass   Bill CARDIAC SURGERY  2006    3x bypass, ceramic valve    COLONOSCOPY  07      adenomas polyps    COLONOSCOPY  02/11    hyperplastic polyps REDO 5 yrs    COLONOSCOPY  05/18/2017    OTHER SURGICAL HISTORY  8/19/11    CYSTOSCOPY, WITH PUSH BACK AND LEFT STENT PLACEMENT    TONSILLECTOMY      UPPER GASTROINTESTINAL ENDOSCOPY  2011    Neg.     UPPER GASTROINTESTINAL ENDOSCOPY      URETER STENT PLACEMENT  2011       Allergies   Allergen Reactions    Crestor [Rosuvastatin]        Medication listwas reviewed and updated as needed in [facility]    Social History     Socioeconomic History    Marital status:      Spouse name: Not on file    Number of children: Not on file    Years of education: Not on file    Highest education level: Not on file   Occupational History    Not on file   Social Needs    Financial resource strain: Not on file    Food insecurity:     Worry: Not on file     Inability: Not on file    Transportation needs:     Medical: Not on file     Non-medical: Not on file   Tobacco Use    Smoking status: Never Smoker    Smokeless tobacco: Never Used   Substance and Sexual Activity    Alcohol use: No     Comment: 2 a month    Drug use: No    Sexual activity: Yes     Partners: Female   Lifestyle    Physical activity:     Days per week: Not on file     Minutes per session: Not on file    Stress: Not on file   Relationships    Social connections:     Talks on phone: Not on file     Gets together: Not on file     Attends Gnosticist service: Not on file     Active member of club or organization: Not on file     Attends meetings of clubs or organizations: Not on file     Relationship status: Not on file    Intimate partner violence:     Fear of current or ex partner: Not on file     Emotionally abused: Not on file     Physically abused: Not on file     Forced sexual activity: Not on file   Other Topics Concern    Not on file   Social History Narrative    ** Merged History Encounter **            Family History   Problem Relation Age of Onset    Cancer Mother         skin    Psoriasis Mother     Cancer Father         throat, jaw    Cancer Sister         brain,wengers    Cancer Brother         skin    Diabetes Brother         Review of Systems: CompleteReview of system reviewed with patient and noted on attached review of system sheet. Review of Systems    Physical Exam:  Blood pressure 129/63, pulse 67, temperature 98 °F (36.7 °C), temperature source Oral, resp. rate 18, height 5' 9\" (1.753 m), weight 180 lb (81.6 kg), SpO2 97 %.'  Constitutional:  No acute distress. HENT:  Oropharynx is clear and moist. Class 3/4 airway. No thyromegaly. Eyes:  Conjunctivae are normal. No scleral icterus. Neck: . No tracheal deviation present. No obvious thyroid mass. Cardiovascular: Regular rate, regular rhythm, click noted from mechanical valve,  No right ventricular heave. No lower extremity edema. Pulmonary/Chest: CTABL, No wheezes, rhonchi, or rales. No accessory muscle usage or stridor. Abdominal: Soft. Bowel sounds present. No distension. No tenderness. Musculoskeletal: No cyanosis. No clubbing. No obvious joint deformity. Lymphadenopathy: No cervical or supraclavicularadenopathy. Skin: Skin is warm and dry. No rash or nodules on the exposed extremities. Psychiatric: Normal mood and affect. Behavior is normal.  No anxiety. Neurologic: Alert, awake and oriented. Speech fluent    Data:   CXR 1/21/2020: No active cardiopulmonary disease    PFTs 1/28/2020: Spirometry showed FVC 3.46 L, 82% predicted, FEV1 3.1 L, 100% predicted,  with FEV1/FVC ratio of 90%. There was no response to bronchodilator. Lung volumes showed air trapping, diffusion capacity was normal.     IMPRESSION:  Except for air trapping, this is a normal PFT. Air  trapping may indicate a subclinical obstructive process such as asthma.    Clinical correlation is

## 2020-02-12 NOTE — PATIENT INSTRUCTIONS
Remember to bring all pulmonary medications to your next appointment with the office. Please keep all of your future appointments scheduled by [address] [facility] office. Out of respect for other patients and providers, you may be asked to reschedule your appointment if you arrive later than your scheduled appointment time. Appointments cancelled less than 24hrs in advance will be considered a no show. Patients with three missed appointments within 1 year or four missed appointments within 2 years can be dismissed from the practice. You may receive a survey regarding the care you received during your visit. Your input is valuable to us. We encourage you to complete and return your survey. We hope you will choose us in the future for your healthcare needs.

## 2020-02-17 ENCOUNTER — OFFICE VISIT (OUTPATIENT)
Dept: INTERNAL MEDICINE CLINIC | Age: 70
End: 2020-02-17
Payer: MEDICARE

## 2020-02-17 VITALS
HEART RATE: 72 BPM | SYSTOLIC BLOOD PRESSURE: 130 MMHG | BODY MASS INDEX: 26.43 KG/M2 | OXYGEN SATURATION: 98 % | WEIGHT: 179 LBS | DIASTOLIC BLOOD PRESSURE: 78 MMHG

## 2020-02-17 PROCEDURE — G8427 DOCREV CUR MEDS BY ELIG CLIN: HCPCS | Performed by: INTERNAL MEDICINE

## 2020-02-17 PROCEDURE — 1036F TOBACCO NON-USER: CPT | Performed by: INTERNAL MEDICINE

## 2020-02-17 PROCEDURE — 3051F HG A1C>EQUAL 7.0%<8.0%: CPT | Performed by: INTERNAL MEDICINE

## 2020-02-17 PROCEDURE — 4040F PNEUMOC VAC/ADMIN/RCVD: CPT | Performed by: INTERNAL MEDICINE

## 2020-02-17 PROCEDURE — 1123F ACP DISCUSS/DSCN MKR DOCD: CPT | Performed by: INTERNAL MEDICINE

## 2020-02-17 PROCEDURE — G8484 FLU IMMUNIZE NO ADMIN: HCPCS | Performed by: INTERNAL MEDICINE

## 2020-02-17 PROCEDURE — 99214 OFFICE O/P EST MOD 30 MIN: CPT | Performed by: INTERNAL MEDICINE

## 2020-02-17 PROCEDURE — 3017F COLORECTAL CA SCREEN DOC REV: CPT | Performed by: INTERNAL MEDICINE

## 2020-02-17 PROCEDURE — 2022F DILAT RTA XM EVC RTNOPTHY: CPT | Performed by: INTERNAL MEDICINE

## 2020-02-17 PROCEDURE — G8417 CALC BMI ABV UP PARAM F/U: HCPCS | Performed by: INTERNAL MEDICINE

## 2020-02-17 NOTE — PROGRESS NOTES
pairs    Type II or unspecified type diabetes mellitus without mention of complication, not stated as uncontrolled        Past Surgical History:   Procedure Laterality Date    CARDIAC SURGERY      valve replacement and bypass    CARDIAC SURGERY  2006    3x bypass, ceramic valve    COLONOSCOPY  07      adenomas polyps    COLONOSCOPY  02/11    hyperplastic polyps REDO 5 yrs    COLONOSCOPY  05/18/2017    OTHER SURGICAL HISTORY  8/19/11    CYSTOSCOPY, WITH PUSH BACK AND LEFT STENT PLACEMENT    TONSILLECTOMY      UPPER GASTROINTESTINAL ENDOSCOPY  2011    Neg.     UPPER GASTROINTESTINAL ENDOSCOPY      URETER STENT PLACEMENT  2011       Family History   Problem Relation Age of Onset    Cancer Mother         skin    Psoriasis Mother     Cancer Father         throat, jaw    Cancer Sister         brain,wengers    Cancer Brother         skin    Diabetes Brother        Social History     Socioeconomic History    Marital status:      Spouse name: Not on file    Number of children: Not on file    Years of education: Not on file    Highest education level: Not on file   Occupational History    Not on file   Social Needs    Financial resource strain: Not on file    Food insecurity:     Worry: Not on file     Inability: Not on file    Transportation needs:     Medical: Not on file     Non-medical: Not on file   Tobacco Use    Smoking status: Never Smoker    Smokeless tobacco: Never Used   Substance and Sexual Activity    Alcohol use: No     Comment: 2 a month    Drug use: No    Sexual activity: Yes     Partners: Female   Lifestyle    Physical activity:     Days per week: Not on file     Minutes per session: Not on file    Stress: Not on file   Relationships    Social connections:     Talks on phone: Not on file     Gets together: Not on file     Attends Muslim service: Not on file     Active member of club or organization: Not on file     Attends meetings of clubs or organizations: Not on file     Relationship status: Not on file    Intimate partner violence:     Fear of current or ex partner: Not on file     Emotionally abused: Not on file     Physically abused: Not on file     Forced sexual activity: Not on file   Other Topics Concern    Not on file   Social History Narrative    ** Merged History Encounter **            Review of Systems   Constitutional: negative   HENT: negative   EYES: negative   Respiratory: Shortness of breath. Started using MDI as mentioned above  Gastrointestinal: negative   Endocrine: Accu-Chek running a bit higher  Musculoskeletal: negative   Skin: negative   Allergic/Immunological: negative   Hematological: negative   Psychiatric/Behavorial: negative   CV: negative   CNS: negative   : Negative  S/E:Negative  Renal: Slight improvement in creatinine    OBJECTIVE:  /78   Pulse 72   Wt 179 lb (81.2 kg)   SpO2 98%   BMI 26.43 kg/m²     Physical Exam:    Constitutional: He is oriented to person, place, and time. He appears well-developed and well-nourished. No distress. HEENT:   Head: Normocephalic and atraumatic. Right Ear: Tympanic membrane, external ear and ear canal normal.   Left Ear: Tympanic membrane, external ear and ear canal normal.   Nose: Nose normal.   Mouth/Throat: Oropharynx is clear and moist and mucous membranes are normal. No oropharyngeal exudate or posterior oropharyngeal erythema. He has no cervical adenopathy. Eyes: Conjunctivae and extraocular motions are normal. Pupils are equal, round, and reactive to light. Neck: Full passive range of motion without pain. Neck supple. No JVD present. Carotid bruit is not present. No mass and no thyromegaly present. Cardiovascular: Normal rate, regular rhythm, normal heart sounds and intact distal pulses. Exam reveals no gallop and no friction rub. JAZZY murmur heard. Pulmonary/Chest: Effort normal and breath sounds normal. No respiratory distress. He has no wheezes, rhonchi or rales. Abdominal: Soft, non-tender. Bowel sounds and aorta are normal. There is no organomegaly, mass or bruit. Genitourinary: Not examined. Musculoskeletal: Normal range of motion, no synovitis. He exhibits no edema. Neurological: He is alert and oriented to person, place, and time. He has normal reflexes. No cranial nerve deficit. Coordination normal.   Skin: Skin is warm, dry and intact. No suspicious lesions are noted. Psychiatric: He has a normal mood and affect. His speech is normal and behavior is normal. Judgment, cognition and memory are normal. Blood pressure 130/78, pulse 72, weight 179 lb (81.2 kg), SpO2 98 %. ASSESSMENT/PLAN:    1. SOB (shortness of breath)  Evaluation ongoing as noted above. Now seen and being treated by Dr. Alan Fontana of pulmonary    2. Type 2 diabetes mellitus without complication, without long-term current use of insulin ([facility]  Diabetes requiring some better control. Will increase metformin 3 times daily before breakfast, lunch, and evening meal  - metFORMIN (GLUCOPHAGE) 500 MG tablet; Take 1 tablet by mouth 3 times daily (before meals) Diabetic medicine. Dispense: 270 tablet; Refill: 1    3. Coronary artery disease involving native coronary artery of native heart without angina pectoris  No chest pain at this time. 4. Hyperlipidemia, unspecified hyperlipidemia type     Borderline elevated triglycerides    5. Stage 3 chronic kidney disease (HCC)  Slight improvement in creatinine level  - Basic Metabolic Panel; Future    6. PAC (premature atrial contraction)  Asymptomatic    7. PVC's (premature ventricular contractions)  Asymptomatic    8. GERD  Trial of Pepcid over-the-counter. Discussed dietary GI restriction sheet with the need to stop coffee consumption. Give slip to obtain laboratory study before next office visit  Return to follow-up  Dr. Hi Guzmán    Return in about 3 months (around 5/17/2020), or 40 min, for SOB.

## 2020-02-26 ENCOUNTER — TELEPHONE (OUTPATIENT)
Dept: PULMONOLOGY | Age: 70
End: 2020-02-26

## 2020-02-28 ENCOUNTER — TELEPHONE (OUTPATIENT)
Dept: PULMONOLOGY | Age: 70
End: 2020-02-28

## 2020-03-01 NOTE — TELEPHONE ENCOUNTER
Needs to stop Breo and as per Dr Collette Rojas note -needs CT chest (high resolution) without contrast

## 2020-03-02 ENCOUNTER — TELEPHONE (OUTPATIENT)
Dept: INTERNAL MEDICINE CLINIC | Age: 70
End: 2020-03-02

## 2020-03-02 NOTE — TELEPHONE ENCOUNTER
Pt. Informed to stop breo. CT ordered and order pend to Dr. Cassia Valles to sign. CT scheduled and FU appt. Scheduled with Dr. Kylie Nunn.

## 2020-03-02 NOTE — TELEPHONE ENCOUNTER
Dr. Fariba Saravia is on vacation until March 17th. A doctor out of the office scheduled him for a CT Scan this Friday. Goes back on the 19th to see him.

## 2020-03-02 NOTE — TELEPHONE ENCOUNTER
Call patient: CT scan of chest has been ordered. Give him central scheduling's phone number. Tell him call me the next day after obtaining the CAT scan to get results.   Follow-up with Dr. Ej Duncan of pulmonary  Dr. gonzalez

## 2020-03-06 ENCOUNTER — HOSPITAL ENCOUNTER (OUTPATIENT)
Dept: CT IMAGING | Age: 70
Discharge: HOME | End: 2020-03-06
Payer: MEDICARE

## 2020-03-06 PROCEDURE — 71250 CT THORAX DX C-: CPT

## 2020-03-09 ENCOUNTER — TELEPHONE (OUTPATIENT)
Dept: INTERNAL MEDICINE CLINIC | Age: 70
End: 2020-03-09

## 2020-03-18 ENCOUNTER — TELEPHONE (OUTPATIENT)
Dept: PULMONOLOGY | Age: 70
End: 2020-03-18

## 2020-04-28 ENCOUNTER — TELEPHONE (OUTPATIENT)
Dept: INTERNAL MEDICINE CLINIC | Age: 70
End: 2020-04-28

## 2020-04-28 NOTE — TELEPHONE ENCOUNTER
Patient passed away on [id number] in [place] . Coronor , Dr. Vic Soto would like to speak with you.  Please call [phone]

## 2020-04-30 ENCOUNTER — TELEPHONE (OUTPATIENT)
Dept: INTERNAL MEDICINE CLINIC | Age: 70
End: 2020-04-30

## 2020-04-30 NOTE — TELEPHONE ENCOUNTER
Gigi Kent from [employer] wants to know if provider will sign death certificate it was dropped off in office yesterday. Please advise?

## 2020-04-30 NOTE — TELEPHONE ENCOUNTER
Home called back and said That [facility] left a v-mail mess stating he can come up and  the death certificate. No notes on how this is being handled went to ask [facility] the status and she states that Dr. Tavares Yo will be in office this afternoon at 430 pm to sign the certificate and then she will call the  home to come  and she will meet them outside with the certificate so they do not come inside per policy. .  I have made them aware that it is not signed yet and that they will get a call back once signed by provider then they can come pick it up.

## 2020-05-19 ENCOUNTER — TELEPHONE (OUTPATIENT)
Dept: PULMONOLOGY | Age: 70
End: 2020-05-19